# Patient Record
Sex: MALE | Race: WHITE | Employment: UNEMPLOYED | URBAN - METROPOLITAN AREA
[De-identification: names, ages, dates, MRNs, and addresses within clinical notes are randomized per-mention and may not be internally consistent; named-entity substitution may affect disease eponyms.]

---

## 2017-12-04 PROBLEM — E66.9 OBESITY: Status: ACTIVE | Noted: 2017-12-04

## 2017-12-04 PROBLEM — I50.9 NEW ONSET OF CONGESTIVE HEART FAILURE (HCC): Status: ACTIVE | Noted: 2017-12-04

## 2017-12-04 PROBLEM — I25.10 CAD (CORONARY ARTERY DISEASE): Status: ACTIVE | Noted: 2017-12-04

## 2017-12-04 PROBLEM — I10 HTN (HYPERTENSION): Status: ACTIVE | Noted: 2017-12-04

## 2017-12-04 PROBLEM — R60.0 BILATERAL LEG EDEMA: Status: ACTIVE | Noted: 2017-12-04

## 2017-12-11 ENCOUNTER — TELEPHONE (OUTPATIENT)
Dept: CARDIAC REHAB | Age: 62
End: 2017-12-11

## 2017-12-11 NOTE — TELEPHONE ENCOUNTER
office visit on 12/22 at 8:30 am with Jordon Ortiz. Sister will help get to these appointments. EDUCATION: Educated sister on sodium restriction daily and kept information basic as to not adding additional salt with cooking or at the table to his foods and fluid restriction of < 64 ounces daily--sister does mention he is \"trying to drink lots of water\"- again emphasized need to drink consistently about 48-64 ounces daily, the need for daily morning  Weights and to obtain a scale as soon as possible, follow-up, and medication compliance. Reviewed recommended level of activity. Advised to keep activity light and to avoid heavy exertion or holding his breath or being outside in extreme cold weather for long periods of time. Notified sister to call the doctor post discharge if the pt experiences shortness of breath, chest pain, swelling, cough, or weight gain or loss of three pounds in a day/five pounds in a week. Also notified sister to call the doctor if the pt feels dizzy, increased fatigue, decreased or difficulty urinating. Educated on daily morning weights and to call early with a 2-3 lb gain overnight or 5 lb gain in a week when she gets the scale for home use. Advised on discharge weight of 239 lb from the hospital. Advised on phone number of [facility] to sister. Sister verbalized understanding; stated she will call the doctor with any questions or signs of symptom worsening. No additional questions at this time. HF resource number made available for non-urgent questions. RECOMMENDATIONS:  ~[facility] to start with HF education, monitoring and medication review as soon as possible  ~Go to follow up appointments  ~F/U PCP office tomorrow. Care transition faxed to Dr Rozina Aceves. Care transition included reason for hospitalization, procedures performed during hospitalization, services provided during hospitalization, discharge medications, and follow-up treatment and services needed.   ~Cardiology f/u frequently for medication therapy adjustment    ~~~~~~~~~~~~~~~~~~~~~~~~~~~~~~~~~~~~~~~~~~~~~  Additional call placed and spoke to Watson from [facility]. Updated address where pt is staying and phone number given. Informed them this pt needs home care visit as soon as possible due to his new HF diagnosis and high likelihood of readmission with his low health literacy and limited resources.

## 2017-12-22 ENCOUNTER — OFFICE VISIT (OUTPATIENT)
Dept: CARDIOLOGY CLINIC | Age: 62
End: 2017-12-22

## 2017-12-22 VITALS
WEIGHT: 242 LBS | HEIGHT: 69 IN | BODY MASS INDEX: 35.84 KG/M2 | SYSTOLIC BLOOD PRESSURE: 130 MMHG | OXYGEN SATURATION: 98 % | HEART RATE: 71 BPM | DIASTOLIC BLOOD PRESSURE: 90 MMHG

## 2017-12-22 DIAGNOSIS — R06.02 SHORTNESS OF BREATH: ICD-10-CM

## 2017-12-22 DIAGNOSIS — R60.0 BILATERAL LEG EDEMA: ICD-10-CM

## 2017-12-22 DIAGNOSIS — I25.5 ISCHEMIC CARDIOMYOPATHY: Primary | ICD-10-CM

## 2017-12-22 DIAGNOSIS — I25.10 CORONARY ARTERY DISEASE INVOLVING NATIVE HEART WITHOUT ANGINA PECTORIS, UNSPECIFIED VESSEL OR LESION TYPE: ICD-10-CM

## 2017-12-22 DIAGNOSIS — I10 ESSENTIAL HYPERTENSION: ICD-10-CM

## 2017-12-22 PROCEDURE — G8417 CALC BMI ABV UP PARAM F/U: HCPCS | Performed by: NURSE PRACTITIONER

## 2017-12-22 PROCEDURE — 99214 OFFICE O/P EST MOD 30 MIN: CPT | Performed by: NURSE PRACTITIONER

## 2017-12-22 PROCEDURE — 1036F TOBACCO NON-USER: CPT | Performed by: NURSE PRACTITIONER

## 2017-12-22 PROCEDURE — G8598 ASA/ANTIPLAT THER USED: HCPCS | Performed by: NURSE PRACTITIONER

## 2017-12-22 PROCEDURE — 3017F COLORECTAL CA SCREEN DOC REV: CPT | Performed by: NURSE PRACTITIONER

## 2017-12-22 PROCEDURE — 1111F DSCHRG MED/CURRENT MED MERGE: CPT | Performed by: NURSE PRACTITIONER

## 2017-12-22 PROCEDURE — G8484 FLU IMMUNIZE NO ADMIN: HCPCS | Performed by: NURSE PRACTITIONER

## 2017-12-22 PROCEDURE — G8427 DOCREV CUR MEDS BY ELIG CLIN: HCPCS | Performed by: NURSE PRACTITIONER

## 2017-12-22 RX ORDER — CARVEDILOL 12.5 MG/1
TABLET ORAL
Qty: 60 TABLET | Refills: 3
Start: 2017-12-22 | End: 2018-01-02 | Stop reason: SDUPTHER

## 2017-12-22 NOTE — Clinical Note
Please call [facility] and get last BMP from last Friday that was drawn. Results are not in EPIC.  Thanks

## 2017-12-22 NOTE — PROGRESS NOTES
simvastatin (ZOCOR) 80 MG tablet Take 1 tablet by mouth nightly 12/8/17  Yes Kamran Leo MD   aspirin 325 MG EC tablet Take 1 tablet by mouth daily 12/9/17  Yes Kamran Leo MD   valsartan (DIOVAN) 320 MG tablet Take 1 tablet by mouth daily 12/9/17  Yes Kamran Leo MD   carvedilol (COREG) 12.5 MG tablet Take 1 tablet by mouth 2 times daily (with meals) 12/8/17  Yes Kamran Leo MD   amLODIPine (NORVASC) 10 MG tablet Take 1 tablet by mouth daily 12/9/17  Yes Kamran Leo MD   spironolactone (ALDACTONE) 25 MG tablet Take 1 tablet by mouth daily 12/9/17  Yes Kamran Leo MD   furosemide (LASIX) 40 MG tablet Take 1 tablet by mouth 2 times daily 12/8/17  Yes Kamran Leo MD   lorazepam (ATIVAN) 1 MG tablet Take 1 mg by mouth every 6 hours as needed. Yes Historical Provider, MD        Allergies:  Review of patient's allergies indicates no known allergies. Review of Systems:   · Constitutional: there has been no unanticipated weight loss. · Eyes: No vision changes  · ENT: No Headaches, no nasal congestion. No mouth sores or sore throat. · Cardiovascular: Reviewed in HPI  · Respiratory: + cough, no wheezing, no sputum production. · Gastrointestinal: No abdominal pain, no constipation or diarrhea  · Genitourinary: No dysuria, trouble voiding, or hematuria. · Musculoskeletal:  No weakness or joint complaints. · Integumentary: No rash or pruritis. · Neurological: No numbness or tingling. No weakness. No tremor. · Psychiatric: No anxiety, no depression. · Endocrine:  No excessive thirst or urination. · Hematologic/Lymphatic: No abnormal bruising or bleeding, blood clots or swollen lymph nodes.     Physical Examination:    Vitals:    12/22/17 0829 12/22/17 0833   BP: (!) 136/92 (!) 130/90   Pulse: 71    SpO2: 98%    Weight: 242 lb (109.8 kg)    Height: 5' 9\" (1.753 m)         Constitutional and General Appearance: no apparent distress  HEENT:

## 2018-01-02 ENCOUNTER — TELEPHONE (OUTPATIENT)
Dept: CARDIOLOGY CLINIC | Age: 63
End: 2018-01-02

## 2018-01-02 DIAGNOSIS — Z79.899 MEDICATION MANAGEMENT: Primary | ICD-10-CM

## 2018-01-02 RX ORDER — AMLODIPINE BESYLATE 10 MG/1
10 TABLET ORAL DAILY
Qty: 30 TABLET | Refills: 0 | Status: SHIPPED | OUTPATIENT
Start: 2018-01-02 | End: 2018-01-31 | Stop reason: SDUPTHER

## 2018-01-02 RX ORDER — VALSARTAN 320 MG/1
320 TABLET ORAL DAILY
Qty: 30 TABLET | Refills: 0 | Status: SHIPPED | OUTPATIENT
Start: 2018-01-02 | End: 2018-01-02 | Stop reason: SDUPTHER

## 2018-01-02 RX ORDER — AMLODIPINE BESYLATE 10 MG/1
10 TABLET ORAL DAILY
Qty: 30 TABLET | Refills: 0 | Status: SHIPPED | OUTPATIENT
Start: 2018-01-02 | End: 2018-01-02 | Stop reason: SDUPTHER

## 2018-01-02 RX ORDER — SPIRONOLACTONE 25 MG/1
25 TABLET ORAL DAILY
Qty: 30 TABLET | Refills: 0 | Status: SHIPPED | OUTPATIENT
Start: 2018-01-02 | End: 2018-01-02 | Stop reason: SDUPTHER

## 2018-01-02 RX ORDER — SPIRONOLACTONE 25 MG/1
25 TABLET ORAL DAILY
Qty: 30 TABLET | Refills: 0 | Status: SHIPPED | OUTPATIENT
Start: 2018-01-02 | End: 2018-01-31 | Stop reason: SDUPTHER

## 2018-01-02 RX ORDER — SIMVASTATIN 80 MG
80 TABLET ORAL NIGHTLY
Qty: 30 TABLET | Refills: 0 | Status: SHIPPED | OUTPATIENT
Start: 2018-01-02 | End: 2018-01-31 | Stop reason: SDUPTHER

## 2018-01-02 RX ORDER — SIMVASTATIN 80 MG
80 TABLET ORAL NIGHTLY
Qty: 30 TABLET | Refills: 0 | Status: SHIPPED | OUTPATIENT
Start: 2018-01-02 | End: 2018-01-02 | Stop reason: SDUPTHER

## 2018-01-02 RX ORDER — CARVEDILOL 12.5 MG/1
TABLET ORAL
Qty: 90 TABLET | Refills: 0 | Status: SHIPPED | OUTPATIENT
Start: 2018-01-02 | End: 2018-01-31 | Stop reason: SDUPTHER

## 2018-01-02 RX ORDER — FUROSEMIDE 40 MG/1
40 TABLET ORAL 2 TIMES DAILY
Qty: 60 TABLET | Refills: 0 | Status: SHIPPED | OUTPATIENT
Start: 2018-01-02 | End: 2018-01-02 | Stop reason: SDUPTHER

## 2018-01-02 RX ORDER — FUROSEMIDE 40 MG/1
40 TABLET ORAL 2 TIMES DAILY
Qty: 60 TABLET | Refills: 0 | Status: SHIPPED | OUTPATIENT
Start: 2018-01-02 | End: 2018-01-31 | Stop reason: SDUPTHER

## 2018-01-02 RX ORDER — VALSARTAN 320 MG/1
320 TABLET ORAL DAILY
Qty: 30 TABLET | Refills: 0 | Status: SHIPPED | OUTPATIENT
Start: 2018-01-02 | End: 2018-01-31 | Stop reason: SDUPTHER

## 2018-01-02 RX ORDER — CARVEDILOL 12.5 MG/1
TABLET ORAL
Qty: 90 TABLET | Refills: 0 | Status: SHIPPED | OUTPATIENT
Start: 2018-01-02 | End: 2018-01-02 | Stop reason: SDUPTHER

## 2018-01-02 NOTE — TELEPHONE ENCOUNTER
Scripts sent to [facility]. Family called scripts should be sent to [facility]. Scripts sent.

## 2018-01-17 ENCOUNTER — TELEPHONE (OUTPATIENT)
Dept: CARDIOLOGY CLINIC | Age: 63
End: 2018-01-17

## 2018-01-17 NOTE — TELEPHONE ENCOUNTER
SRINIVAS called wanting to know if smm wants the patient to have labs done before his appointment next week.  [facility] states orders can be faxed to [phone]

## 2018-01-23 ENCOUNTER — HOSPITAL ENCOUNTER (OUTPATIENT)
Dept: OTHER | Age: 63
Discharge: HOME | End: 2018-01-31
Attending: INTERNAL MEDICINE | Admitting: INTERNAL MEDICINE

## 2018-01-23 LAB
ANION GAP SERPL CALCULATED.3IONS-SCNC: 15 MMOL/L (ref 3–16)
BUN BLDV-MCNC: 22 MG/DL (ref 7–20)
CALCIUM SERPL-MCNC: 9.1 MG/DL (ref 8.3–10.6)
CHLORIDE BLD-SCNC: 94 MMOL/L (ref 99–110)
CO2: 27 MMOL/L (ref 21–32)
CREAT SERPL-MCNC: 0.8 MG/DL (ref 0.8–1.3)
GFR AFRICAN AMERICAN: >60
GFR NON-AFRICAN AMERICAN: >60
GLUCOSE BLD-MCNC: 278 MG/DL (ref 70–99)
POTASSIUM SERPL-SCNC: 4.4 MMOL/L (ref 3.5–5.1)
SODIUM BLD-SCNC: 136 MMOL/L (ref 136–145)

## 2018-01-24 ENCOUNTER — TELEPHONE (OUTPATIENT)
Dept: CARDIOLOGY CLINIC | Age: 63
End: 2018-01-24

## 2018-01-24 NOTE — TELEPHONE ENCOUNTER
Created telephone encounter. Per Pt HIPAA from can leave results on machine. LMOM relaying message per [facility] regarding labs. Pt to call the office with any concerns.

## 2018-01-25 ENCOUNTER — OFFICE VISIT (OUTPATIENT)
Dept: CARDIOLOGY CLINIC | Age: 63
End: 2018-01-25

## 2018-01-25 VITALS
HEIGHT: 69 IN | WEIGHT: 258.4 LBS | DIASTOLIC BLOOD PRESSURE: 80 MMHG | SYSTOLIC BLOOD PRESSURE: 134 MMHG | OXYGEN SATURATION: 97 % | BODY MASS INDEX: 38.27 KG/M2 | HEART RATE: 64 BPM

## 2018-01-25 DIAGNOSIS — I50.23 ACUTE ON CHRONIC SYSTOLIC CHF (CONGESTIVE HEART FAILURE) (HCC): ICD-10-CM

## 2018-01-25 DIAGNOSIS — I25.5 ISCHEMIC CARDIOMYOPATHY: ICD-10-CM

## 2018-01-25 DIAGNOSIS — I25.10 CORONARY ARTERY DISEASE INVOLVING NATIVE HEART WITHOUT ANGINA PECTORIS, UNSPECIFIED VESSEL OR LESION TYPE: Primary | ICD-10-CM

## 2018-01-25 DIAGNOSIS — I10 ESSENTIAL HYPERTENSION: ICD-10-CM

## 2018-01-25 PROCEDURE — 3017F COLORECTAL CA SCREEN DOC REV: CPT | Performed by: INTERNAL MEDICINE

## 2018-01-25 PROCEDURE — 99214 OFFICE O/P EST MOD 30 MIN: CPT | Performed by: INTERNAL MEDICINE

## 2018-01-25 PROCEDURE — G8484 FLU IMMUNIZE NO ADMIN: HCPCS | Performed by: INTERNAL MEDICINE

## 2018-01-25 PROCEDURE — G8598 ASA/ANTIPLAT THER USED: HCPCS | Performed by: INTERNAL MEDICINE

## 2018-01-25 PROCEDURE — G8417 CALC BMI ABV UP PARAM F/U: HCPCS | Performed by: INTERNAL MEDICINE

## 2018-01-25 PROCEDURE — G8427 DOCREV CUR MEDS BY ELIG CLIN: HCPCS | Performed by: INTERNAL MEDICINE

## 2018-01-25 PROCEDURE — 1036F TOBACCO NON-USER: CPT | Performed by: INTERNAL MEDICINE

## 2018-01-25 NOTE — PROGRESS NOTES
[address]   Cardiac Followup    Referring Provider:  Mason Andrea DO     Chief Complaint   Patient presents with    Follow-up     no cardiac complaints      Subjective:  Mr Randy Arndt is being seen today for cardiology follow up of CAD, CMP, CHF, HTN; no complaints today    History of Present Illness:  De Roly a 58 y. o. patient here for routine cardiac f/u. I originally met him in hospital 12/4/17. He has PMH of CAD s/p CABG 2009, remote MI, HLD, and HTN.  Formerly saw Dr. Miguel Pastor but no cardiac care for several years. Shahriar Lorena if taking meds regularly and I question his compliance and medical understanding. Hira Crawford reports leg swelling for 5-6 months.  His right leg became red and swelling worse prompting him to seek medical attention. Prior IRVIN in May 2007 showed normal LV function; no wall motion abnl; no vegetation or thrombus. His admit EKG showed NSR, LAE, LVH, nonspecific IVCD, PVC; ST abnormality consider anterolateral ischemia (T wave change from 8/16 EKG). Note most recent ECHO 12/4/17 showed severe LV systolic dysfunction NO=43-66% with multiple WMA's; moderately dilated LV; grade III diastolic dysfunction with elevated filling pressure; mild MR/AI; RV enlargement with moderately reduced systolic function. Note pro-BNP elevated >10,000 and no Wu enzymes available with CXR negative.              [id number] withacute on chronic systolic CHF likely due to ischemic CM in patient without cardiac care and questionable medication compliance for years. Most recent Nik Pila 12/6/17 no evidence of stress induced ischemia. Large sized anterior,  anteroapical, apical, septal, and inferior fixed defects consistent with  infarction in the territory of the proximal to distal LAD and/or RCA. Only  the lateral wall appears to have decent perfusion. There is severe global LV  systolic dysfunction with ejection fraction of 24%. LV cavity appears mildly  dilated.  Findings are c/w severe ischemic cardiomyopathy. Today he reports to feeling good and he has no complaints of chest pain, SOB, palpitations, dizziness, edema, or orthopnea/PND. He reports to walking up and down driveway 1/4 mile each way without difficulty. His sister is present at exam. He reports that NP Kristian Ahumada decreased lasix from 80 to 40mg daily. Past Medical History:   has a past medical history of Acute MI; CAD (coronary artery disease); CHF (congestive heart failure) ([address] and Hypertension. Surgical History:   has a past surgical history that includes Cardiac surgery. Social History:   reports that he has never smoked. He has never used smokeless tobacco. He reports that he drinks alcohol. Family History:  family history is not on file. Home Medications:  Prior to Admission medications    Medication Sig Start Date End Date Taking? Authorizing Provider   carvedilol (COREG) 12.5 MG tablet 1 tab in the morning and 2 tabs in the evening with food 1/2/18  Yes Marlo Reyna MD   valsartan (DIOVAN) 320 MG tablet Take 1 tablet by mouth daily 1/2/18  Yes Marlo Reyna MD   amLODIPine (NORVASC) 10 MG tablet Take 1 tablet by mouth daily 1/2/18  Yes Marlo Reyna MD   spironolactone (ALDACTONE) 25 MG tablet Take 1 tablet by mouth daily 1/2/18  Yes Marlo Reyna MD   simvastatin (ZOCOR) 80 MG tablet Take 1 tablet by mouth nightly 1/2/18  Yes Marlo Reyna MD   furosemide (LASIX) 40 MG tablet Take 1 tablet by mouth 2 times daily  Patient taking differently: Take 40 mg by mouth daily  1/2/18  Yes Marlo Reyna MD   aspirin 325 MG EC tablet Take 1 tablet by mouth daily 12/9/17  Yes Eve Rae MD   lorazepam (ATIVAN) 1 MG tablet Take 1 mg by mouth every 6 hours as needed. Yes Historical Provider, MD        Allergies:  Review of patient's allergies indicates no known allergies. Review of Systems:   · Constitutional: there has been no unanticipated weight loss.  There's been no change in

## 2018-01-25 NOTE — PATIENT INSTRUCTIONS
Plan:  1. Will check limited ECHO mid February  2. Cut Aspirin 325 mg down to 1/2 tab then switch to 81 mg after bottle completed  3. Increase Lasix to 80 mg per day Keep track of daily weight. If edema or weight increases 3-4 lbs  4. Check CMP, lipids in 7-10 days  5.  Follow up in 3 months

## 2018-01-31 RX ORDER — VALSARTAN 320 MG/1
320 TABLET ORAL DAILY
Qty: 90 TABLET | Refills: 3 | Status: SHIPPED | OUTPATIENT
Start: 2018-01-31 | End: 2018-05-29 | Stop reason: SDUPTHER

## 2018-01-31 RX ORDER — SPIRONOLACTONE 25 MG/1
25 TABLET ORAL DAILY
Qty: 90 TABLET | Refills: 3 | Status: SHIPPED | OUTPATIENT
Start: 2018-01-31 | End: 2018-10-19 | Stop reason: SDUPTHER

## 2018-01-31 RX ORDER — AMLODIPINE BESYLATE 10 MG/1
10 TABLET ORAL DAILY
Qty: 90 TABLET | Refills: 3 | Status: SHIPPED | OUTPATIENT
Start: 2018-01-31 | End: 2018-05-29 | Stop reason: SDUPTHER

## 2018-01-31 RX ORDER — FUROSEMIDE 40 MG/1
80 TABLET ORAL DAILY
Qty: 90 TABLET | Refills: 3 | Status: SHIPPED | OUTPATIENT
Start: 2018-01-31 | End: 2018-06-15 | Stop reason: SDUPTHER

## 2018-01-31 RX ORDER — CARVEDILOL 12.5 MG/1
TABLET ORAL
Qty: 270 TABLET | Refills: 3 | Status: SHIPPED | OUTPATIENT
Start: 2018-01-31 | End: 2019-02-19 | Stop reason: SDUPTHER

## 2018-01-31 RX ORDER — SIMVASTATIN 80 MG
80 TABLET ORAL NIGHTLY
Qty: 90 TABLET | Refills: 3 | Status: ON HOLD | OUTPATIENT
Start: 2018-01-31 | End: 2018-04-15

## 2018-02-01 ENCOUNTER — HOSPITAL ENCOUNTER (OUTPATIENT)
Dept: OTHER | Age: 63
Discharge: HOME | End: 2018-02-28
Attending: INTERNAL MEDICINE | Admitting: INTERNAL MEDICINE

## 2018-02-14 ENCOUNTER — HOSPITAL ENCOUNTER (OUTPATIENT)
Dept: OTHER | Age: 63
Discharge: HOME | End: 2018-02-14
Attending: INTERNAL MEDICINE | Admitting: INTERNAL MEDICINE

## 2018-02-14 LAB
A/G RATIO: 1.2 (ref 1.1–2.2)
ALBUMIN SERPL-MCNC: 4.4 G/DL (ref 3.4–5)
ALP BLD-CCNC: 66 U/L (ref 40–129)
ALT SERPL-CCNC: 19 U/L (ref 10–40)
ANION GAP SERPL CALCULATED.3IONS-SCNC: 13 MMOL/L (ref 3–16)
AST SERPL-CCNC: 18 U/L (ref 15–37)
BILIRUB SERPL-MCNC: 0.5 MG/DL (ref 0–1)
BUN BLDV-MCNC: 27 MG/DL (ref 7–20)
CALCIUM SERPL-MCNC: 9.4 MG/DL (ref 8.3–10.6)
CHLORIDE BLD-SCNC: 97 MMOL/L (ref 99–110)
CHOLESTEROL, FASTING: 207 MG/DL (ref 0–199)
CO2: 30 MMOL/L (ref 21–32)
CREAT SERPL-MCNC: 0.9 MG/DL (ref 0.8–1.3)
GFR AFRICAN AMERICAN: >60
GFR NON-AFRICAN AMERICAN: >60
GLOBULIN: 3.7 G/DL
GLUCOSE FASTING: 191 MG/DL (ref 70–99)
HDLC SERPL-MCNC: 42 MG/DL (ref 40–60)
LDL CHOLESTEROL CALCULATED: 112 MG/DL
POTASSIUM SERPL-SCNC: 4.1 MMOL/L (ref 3.5–5.1)
SODIUM BLD-SCNC: 140 MMOL/L (ref 136–145)
TOTAL PROTEIN: 8.1 G/DL (ref 6.4–8.2)
TRIGLYCERIDE, FASTING: 267 MG/DL (ref 0–150)
VLDLC SERPL CALC-MCNC: 53 MG/DL

## 2018-02-15 DIAGNOSIS — I25.10 CORONARY ARTERY DISEASE INVOLVING NATIVE CORONARY ARTERY OF NATIVE HEART WITHOUT ANGINA PECTORIS: Primary | ICD-10-CM

## 2018-02-15 RX ORDER — ROSUVASTATIN CALCIUM 20 MG/1
20 TABLET, COATED ORAL DAILY
Qty: 30 TABLET | Refills: 3 | Status: SHIPPED | OUTPATIENT
Start: 2018-02-15 | End: 2018-05-15 | Stop reason: SDUPTHER

## 2018-02-15 NOTE — TELEPHONE ENCOUNTER
----- Message from Uriel Simon MD sent at 2/14/2018  5:23 PM EST -----  Labs are good except for LDL > 100, TG > 200 (267), and TC > 200 (207). Was he fasting? Is he taking 80mg zocor qhs? If yest to these questions I would switch him to generic crestor 20mg daily. He needs to watch diet closely and would repeat fasting lipids and LFT's in 2 months.

## 2018-02-16 ENCOUNTER — HOSPITAL ENCOUNTER (OUTPATIENT)
Dept: NON INVASIVE DIAGNOSTICS | Age: 63
Discharge: HOME | End: 2018-02-16
Attending: INTERNAL MEDICINE | Admitting: INTERNAL MEDICINE

## 2018-02-16 DIAGNOSIS — I50.23 ACUTE ON CHRONIC SYSTOLIC CONGESTIVE HEART FAILURE (HCC): ICD-10-CM

## 2018-02-19 ENCOUNTER — TELEPHONE (OUTPATIENT)
Dept: CARDIOLOGY CLINIC | Age: 63
End: 2018-02-19

## 2018-04-02 ENCOUNTER — OFFICE VISIT (OUTPATIENT)
Dept: CARDIOLOGY CLINIC | Age: 63
End: 2018-04-02

## 2018-04-02 VITALS
DIASTOLIC BLOOD PRESSURE: 76 MMHG | HEIGHT: 69 IN | SYSTOLIC BLOOD PRESSURE: 110 MMHG | BODY MASS INDEX: 36.88 KG/M2 | HEART RATE: 82 BPM | WEIGHT: 249 LBS

## 2018-04-02 DIAGNOSIS — I25.5 ISCHEMIC CARDIOMYOPATHY: Primary | ICD-10-CM

## 2018-04-02 PROCEDURE — G8427 DOCREV CUR MEDS BY ELIG CLIN: HCPCS | Performed by: INTERNAL MEDICINE

## 2018-04-02 PROCEDURE — 93000 ELECTROCARDIOGRAM COMPLETE: CPT | Performed by: INTERNAL MEDICINE

## 2018-04-02 PROCEDURE — 3017F COLORECTAL CA SCREEN DOC REV: CPT | Performed by: INTERNAL MEDICINE

## 2018-04-02 PROCEDURE — G8417 CALC BMI ABV UP PARAM F/U: HCPCS | Performed by: INTERNAL MEDICINE

## 2018-04-02 PROCEDURE — 99215 OFFICE O/P EST HI 40 MIN: CPT | Performed by: INTERNAL MEDICINE

## 2018-04-03 ENCOUNTER — TELEPHONE (OUTPATIENT)
Dept: CARDIOLOGY CLINIC | Age: 63
End: 2018-04-03

## 2018-04-13 ENCOUNTER — HOSPITAL ENCOUNTER (OUTPATIENT)
Dept: OTHER | Age: 63
Discharge: HOME | End: 2018-04-13
Attending: INTERNAL MEDICINE | Admitting: INTERNAL MEDICINE

## 2018-04-13 LAB
CHOLESTEROL, FASTING: 263 MG/DL (ref 0–199)
HDLC SERPL-MCNC: 39 MG/DL (ref 40–60)
LDL CHOLESTEROL CALCULATED: ABNORMAL MG/DL
LDL CHOLESTEROL DIRECT: 151 MG/DL
TRIGLYCERIDE, FASTING: 567 MG/DL (ref 0–150)
VLDLC SERPL CALC-MCNC: ABNORMAL MG/DL

## 2018-04-15 PROBLEM — E11.10 DKA, TYPE 2, NOT AT GOAL (HCC): Status: ACTIVE | Noted: 2018-04-15

## 2018-04-16 ENCOUNTER — TELEPHONE (OUTPATIENT)
Dept: CARDIOLOGY CLINIC | Age: 63
End: 2018-04-16

## 2018-04-17 PROBLEM — E11.9 DIABETES MELLITUS (HCC): Status: ACTIVE | Noted: 2018-04-17

## 2018-04-17 PROBLEM — E11.10 DKA, TYPE 2, NOT AT GOAL (HCC): Status: RESOLVED | Noted: 2018-04-15 | Resolved: 2018-04-17

## 2018-04-18 DIAGNOSIS — Z95.810 BIVENTRICULAR ICD (IMPLANTABLE CARDIOVERTER-DEFIBRILLATOR) IN PLACE: Primary | ICD-10-CM

## 2018-04-19 ENCOUNTER — PROCEDURE VISIT (OUTPATIENT)
Dept: CARDIOLOGY CLINIC | Age: 63
End: 2018-04-19

## 2018-04-19 DIAGNOSIS — Z95.810 BIVENTRICULAR ICD (IMPLANTABLE CARDIOVERTER-DEFIBRILLATOR) IN PLACE: Primary | ICD-10-CM

## 2018-04-19 PROBLEM — I44.7 LBBB (LEFT BUNDLE BRANCH BLOCK): Status: ACTIVE | Noted: 2018-04-19

## 2018-04-19 PROCEDURE — 93284 PRGRMG EVAL IMPLANTABLE DFB: CPT | Performed by: INTERNAL MEDICINE

## 2018-04-27 ENCOUNTER — TELEPHONE (OUTPATIENT)
Dept: CARDIOLOGY CLINIC | Age: 63
End: 2018-04-27

## 2018-04-27 ENCOUNTER — PROCEDURE VISIT (OUTPATIENT)
Dept: CARDIOLOGY CLINIC | Age: 63
End: 2018-04-27

## 2018-04-27 DIAGNOSIS — I25.5 ISCHEMIC CARDIOMYOPATHY: ICD-10-CM

## 2018-04-27 DIAGNOSIS — I42.9 CARDIOMYOPATHY, UNSPECIFIED TYPE (HCC): ICD-10-CM

## 2018-04-27 DIAGNOSIS — I44.7 LBBB (LEFT BUNDLE BRANCH BLOCK): ICD-10-CM

## 2018-04-27 DIAGNOSIS — Z95.810 BIVENTRICULAR ICD (IMPLANTABLE CARDIOVERTER-DEFIBRILLATOR) IN PLACE: Primary | ICD-10-CM

## 2018-05-14 ENCOUNTER — OFFICE VISIT (OUTPATIENT)
Dept: CARDIOLOGY CLINIC | Age: 63
End: 2018-05-14

## 2018-05-14 ENCOUNTER — PROCEDURE VISIT (OUTPATIENT)
Dept: CARDIOLOGY CLINIC | Age: 63
End: 2018-05-14

## 2018-05-14 VITALS
HEART RATE: 67 BPM | BODY MASS INDEX: 37.92 KG/M2 | SYSTOLIC BLOOD PRESSURE: 110 MMHG | DIASTOLIC BLOOD PRESSURE: 70 MMHG | WEIGHT: 256 LBS | OXYGEN SATURATION: 98 % | HEIGHT: 69 IN

## 2018-05-14 DIAGNOSIS — I25.10 CORONARY ARTERY DISEASE INVOLVING NATIVE CORONARY ARTERY OF NATIVE HEART WITHOUT ANGINA PECTORIS: ICD-10-CM

## 2018-05-14 DIAGNOSIS — I10 ESSENTIAL HYPERTENSION: ICD-10-CM

## 2018-05-14 DIAGNOSIS — I25.5 ISCHEMIC CARDIOMYOPATHY: Primary | ICD-10-CM

## 2018-05-14 DIAGNOSIS — Z95.810 BIVENTRICULAR ICD (IMPLANTABLE CARDIOVERTER-DEFIBRILLATOR) IN PLACE: Primary | ICD-10-CM

## 2018-05-14 DIAGNOSIS — I25.5 ISCHEMIC CARDIOMYOPATHY: ICD-10-CM

## 2018-05-14 DIAGNOSIS — I44.7 LBBB (LEFT BUNDLE BRANCH BLOCK): ICD-10-CM

## 2018-05-14 DIAGNOSIS — I50.9 NEW ONSET OF CONGESTIVE HEART FAILURE (HCC): ICD-10-CM

## 2018-05-14 DIAGNOSIS — E87.1 HYPONATREMIA: ICD-10-CM

## 2018-05-14 PROCEDURE — G8427 DOCREV CUR MEDS BY ELIG CLIN: HCPCS | Performed by: NURSE PRACTITIONER

## 2018-05-14 PROCEDURE — 3017F COLORECTAL CA SCREEN DOC REV: CPT | Performed by: NURSE PRACTITIONER

## 2018-05-14 PROCEDURE — G8417 CALC BMI ABV UP PARAM F/U: HCPCS | Performed by: NURSE PRACTITIONER

## 2018-05-14 PROCEDURE — 1036F TOBACCO NON-USER: CPT | Performed by: NURSE PRACTITIONER

## 2018-05-14 PROCEDURE — 93284 PRGRMG EVAL IMPLANTABLE DFB: CPT | Performed by: INTERNAL MEDICINE

## 2018-05-14 PROCEDURE — 99214 OFFICE O/P EST MOD 30 MIN: CPT | Performed by: NURSE PRACTITIONER

## 2018-05-14 PROCEDURE — G8598 ASA/ANTIPLAT THER USED: HCPCS | Performed by: NURSE PRACTITIONER

## 2018-05-14 PROCEDURE — 1111F DSCHRG MED/CURRENT MED MERGE: CPT | Performed by: NURSE PRACTITIONER

## 2018-05-16 RX ORDER — ROSUVASTATIN CALCIUM 20 MG/1
20 TABLET, COATED ORAL DAILY
Qty: 90 TABLET | Refills: 1 | Status: SHIPPED | OUTPATIENT
Start: 2018-05-16 | End: 2018-06-15 | Stop reason: SDUPTHER

## 2018-05-21 ENCOUNTER — HOSPITAL ENCOUNTER (OUTPATIENT)
Dept: OTHER | Age: 63
Discharge: HOME | End: 2018-05-21
Attending: NURSE PRACTITIONER | Admitting: NURSE PRACTITIONER

## 2018-05-21 DIAGNOSIS — E87.1 HYPONATREMIA: ICD-10-CM

## 2018-05-21 LAB
ANION GAP SERPL CALCULATED.3IONS-SCNC: 17 MMOL/L (ref 3–16)
BUN BLDV-MCNC: 36 MG/DL (ref 7–20)
CALCIUM SERPL-MCNC: 9.2 MG/DL (ref 8.3–10.6)
CHLORIDE BLD-SCNC: 103 MMOL/L (ref 99–110)
CO2: 25 MMOL/L (ref 21–32)
CREAT SERPL-MCNC: 1.2 MG/DL (ref 0.8–1.3)
GFR AFRICAN AMERICAN: >60
GFR NON-AFRICAN AMERICAN: >60
GLUCOSE BLD-MCNC: 55 MG/DL (ref 70–99)
POTASSIUM SERPL-SCNC: 4.1 MMOL/L (ref 3.5–5.1)
SODIUM BLD-SCNC: 145 MMOL/L (ref 136–145)

## 2018-05-22 ENCOUNTER — TELEPHONE (OUTPATIENT)
Dept: CARDIOLOGY CLINIC | Age: 63
End: 2018-05-22

## 2018-05-30 RX ORDER — VALSARTAN 320 MG/1
320 TABLET ORAL DAILY
Qty: 90 TABLET | Refills: 3 | Status: SHIPPED | OUTPATIENT
Start: 2018-05-30 | End: 2018-06-13 | Stop reason: SDUPTHER

## 2018-05-30 RX ORDER — AMLODIPINE BESYLATE 10 MG/1
10 TABLET ORAL DAILY
Qty: 90 TABLET | Refills: 3 | Status: SHIPPED | OUTPATIENT
Start: 2018-05-30 | End: 2018-06-13 | Stop reason: SDUPTHER

## 2018-06-13 RX ORDER — AMLODIPINE BESYLATE 10 MG/1
10 TABLET ORAL DAILY
Qty: 90 TABLET | Refills: 3 | Status: SHIPPED | OUTPATIENT
Start: 2018-06-13 | End: 2019-07-01

## 2018-06-13 RX ORDER — VALSARTAN 320 MG/1
320 TABLET ORAL DAILY
Qty: 90 TABLET | Refills: 3 | Status: SHIPPED | OUTPATIENT
Start: 2018-06-13 | End: 2018-10-19 | Stop reason: ALTCHOICE

## 2018-06-15 RX ORDER — ROSUVASTATIN CALCIUM 20 MG/1
20 TABLET, COATED ORAL DAILY
Qty: 90 TABLET | Refills: 5 | Status: SHIPPED | OUTPATIENT
Start: 2018-06-15 | End: 2018-12-04 | Stop reason: SDUPTHER

## 2018-06-15 RX ORDER — FUROSEMIDE 40 MG/1
80 TABLET ORAL DAILY
Qty: 90 TABLET | Refills: 5 | Status: SHIPPED | OUTPATIENT
Start: 2018-06-15 | End: 2019-04-04 | Stop reason: SDUPTHER

## 2018-07-16 ENCOUNTER — PROCEDURE VISIT (OUTPATIENT)
Dept: CARDIOLOGY CLINIC | Age: 63
End: 2018-07-16

## 2018-07-16 ENCOUNTER — OFFICE VISIT (OUTPATIENT)
Dept: CARDIOLOGY CLINIC | Age: 63
End: 2018-07-16

## 2018-07-16 VITALS
HEART RATE: 70 BPM | BODY MASS INDEX: 38.21 KG/M2 | DIASTOLIC BLOOD PRESSURE: 70 MMHG | SYSTOLIC BLOOD PRESSURE: 90 MMHG | HEIGHT: 69 IN | WEIGHT: 258 LBS | OXYGEN SATURATION: 97 %

## 2018-07-16 DIAGNOSIS — I50.22 CHRONIC SYSTOLIC CONGESTIVE HEART FAILURE (HCC): ICD-10-CM

## 2018-07-16 DIAGNOSIS — I25.5 ISCHEMIC CARDIOMYOPATHY: Primary | ICD-10-CM

## 2018-07-16 DIAGNOSIS — I42.9 CARDIOMYOPATHY, UNSPECIFIED TYPE (HCC): ICD-10-CM

## 2018-07-16 DIAGNOSIS — Z95.810 BIVENTRICULAR ICD (IMPLANTABLE CARDIOVERTER-DEFIBRILLATOR) IN PLACE: Primary | ICD-10-CM

## 2018-07-16 DIAGNOSIS — E87.1 HYPONATREMIA: ICD-10-CM

## 2018-07-16 DIAGNOSIS — I25.10 CORONARY ARTERY DISEASE INVOLVING NATIVE CORONARY ARTERY OF NATIVE HEART WITHOUT ANGINA PECTORIS: ICD-10-CM

## 2018-07-16 DIAGNOSIS — I10 ESSENTIAL HYPERTENSION: ICD-10-CM

## 2018-07-16 DIAGNOSIS — I25.5 ISCHEMIC CARDIOMYOPATHY: ICD-10-CM

## 2018-07-16 PROCEDURE — 3017F COLORECTAL CA SCREEN DOC REV: CPT | Performed by: NURSE PRACTITIONER

## 2018-07-16 PROCEDURE — 93284 PRGRMG EVAL IMPLANTABLE DFB: CPT | Performed by: INTERNAL MEDICINE

## 2018-07-16 PROCEDURE — 93290 INTERROG DEV EVAL ICPMS IP: CPT | Performed by: NURSE PRACTITIONER

## 2018-07-16 PROCEDURE — G8598 ASA/ANTIPLAT THER USED: HCPCS | Performed by: NURSE PRACTITIONER

## 2018-07-16 PROCEDURE — G8417 CALC BMI ABV UP PARAM F/U: HCPCS | Performed by: NURSE PRACTITIONER

## 2018-07-16 PROCEDURE — 1036F TOBACCO NON-USER: CPT | Performed by: NURSE PRACTITIONER

## 2018-07-16 PROCEDURE — 99214 OFFICE O/P EST MOD 30 MIN: CPT | Performed by: NURSE PRACTITIONER

## 2018-07-16 PROCEDURE — G8427 DOCREV CUR MEDS BY ELIG CLIN: HCPCS | Performed by: NURSE PRACTITIONER

## 2018-07-16 NOTE — PROGRESS NOTES
spironolactone (ALDACTONE) 25 MG tablet Take 1 tablet by mouth daily 90 tablet 3     No current facility-administered medications for this visit. REVIEW OF SYSTEMS:    CONSTITUTIONAL: No major weight gain or loss, fatigue, weakness, night sweats or fever. HEENT: No new vision difficulties or ringing in the ears. RESPIRATORY: No new SOB, PND, orthopnea or cough. CARDIOVASCULAR: See HPI  GI: No nausea, vomiting, diarrhea, constipation, abdominal pain or changes in bowel habits. : No urinary frequency, urgency, incontinence hematuria or dysuria. SKIN: No cyanosis or skin lesions. MUSCULOSKELETAL: No new muscle or joint pain. NEUROLOGICAL: No syncope or TIA-like symptoms. PSYCHIATRIC: No anxiety, pain, insomnia or depression    Objective:   PHYSICAL EXAM:       Vitals:    07/16/18 1051   BP: 90/70   Pulse: 70   SpO2: 97%   Weight: 258 lb (117 kg)   Height: 5' 9\" (1.753 m)        VITALS:  BP 90/70   Pulse 70   Ht 5' 9\" (1.753 m)   Wt 258 lb (117 kg)   SpO2 97%   BMI 38.10 kg/m²   CONSTITUTIONAL: Cooperative, no apparent distress, and appears well nourished / developed  NEUROLOGIC:  Awake and orientated to person, place and time. PSYCH: Calm affect. SKIN: Warm and dry. HEENT: Sclera non-icteric, normocephalic, neck supple, no elevation of JVP, normal carotid pulses with no bruits and thyroid normal size. LUNGS:  No increased work of breathing and clear to auscultation, no crackles or wheezing  CARDIOVASCULAR:  Regular rate 60 and rhythm with no murmurs, gallops, rubs, or abnormal heart sounds, normal PMI. The apical impulses not displaced  JVP less than 8 cm H2O  Heart tones are crisp and normal  Cervical veins are not engorged  The carotid upstroke is normal in amplitude and contour without delay or bruit  JVP is not elevated  ABDOMEN:  Normal bowel sounds, non-distended and non-tender to palpation  EXT: No edema, no calf tenderness. Pulses are present bilaterally.     DATA:    Lab Results angina pectoris   ~s/p CABG  ~stable : denies angina  ~ASA / statin / carvedilol     3. Essential hypertension   ~controlled    4. Hyponatremia   ~resolved           I had the opportunity to review the clinical symptoms and presentation of Jayna Nick. Plan:     1. Continue present management  2. F/U in 4 months     Overall the patient is stable from CV standpoint    I have addresed the patient's cardiac risk factors and adjusted pharmacologic treatment as needed. In addition, I have reinforced the need for patient directed risk factor modification. Further evaluation will be based upon the patient's clinical course and testing results. All questions and concerns were addressed to the patient/sister. Alternatives to my treatment were discussed. The patient is not currently smoking. The risks related to smoking were reviewed with the patient. Recommend maintaining a smoke-free lifestyle. Products available for smoking cessation were discussed in detail. Patient is on a beta-blocker  Patient is on an ARB  Patient is on a statin     Antiplatelet therapy has been recommended / prescribed for this patient. Education conducted on adverse reactions including bleeding was discussed. Angiotension inhibitor/angiotension receptor blocker has been prescribed / recommended for congestive heart failure. Daily weight, low sodium diet were discussed. Patient instructed to call the office with a weight gain: > 3 # over night or 5# in one week; swelling, SOB/orthopnea/PND    The patient verbalizes understanding not to stop medications without discussing with us. Discussed exercise: 30-60 minutes 7 days/week  Discussed Low saturated fat/JAELYN diet. SMBG 87    Thank you for allowing to us to participate in the care of Jayna Nick.     NIXON Frost    Documentation of today's visit sent to PCP

## 2018-09-12 ENCOUNTER — TELEPHONE (OUTPATIENT)
Dept: CARDIOLOGY CLINIC | Age: 63
End: 2018-09-12

## 2018-09-12 RX ORDER — IRBESARTAN 300 MG/1
300 TABLET ORAL NIGHTLY
Qty: 90 TABLET | Refills: 3 | Status: SHIPPED | OUTPATIENT
Start: 2018-09-12 | End: 2020-01-27

## 2018-10-19 ENCOUNTER — PROCEDURE VISIT (OUTPATIENT)
Dept: CARDIOLOGY CLINIC | Age: 63
End: 2018-10-19
Payer: COMMERCIAL

## 2018-10-19 ENCOUNTER — OFFICE VISIT (OUTPATIENT)
Dept: CARDIOLOGY CLINIC | Age: 63
End: 2018-10-19
Payer: COMMERCIAL

## 2018-10-19 VITALS
WEIGHT: 290 LBS | HEART RATE: 74 BPM | SYSTOLIC BLOOD PRESSURE: 150 MMHG | OXYGEN SATURATION: 98 % | DIASTOLIC BLOOD PRESSURE: 100 MMHG | HEIGHT: 69 IN | BODY MASS INDEX: 42.95 KG/M2

## 2018-10-19 DIAGNOSIS — I42.9 CARDIOMYOPATHY, UNSPECIFIED TYPE (HCC): Primary | ICD-10-CM

## 2018-10-19 DIAGNOSIS — I50.9 NEW ONSET OF CONGESTIVE HEART FAILURE (HCC): ICD-10-CM

## 2018-10-19 DIAGNOSIS — Z95.810 BIVENTRICULAR ICD (IMPLANTABLE CARDIOVERTER-DEFIBRILLATOR) IN PLACE: ICD-10-CM

## 2018-10-19 DIAGNOSIS — I25.5 ISCHEMIC CARDIOMYOPATHY: ICD-10-CM

## 2018-10-19 DIAGNOSIS — Z95.810 BIVENTRICULAR ICD (IMPLANTABLE CARDIOVERTER-DEFIBRILLATOR) IN PLACE: Primary | ICD-10-CM

## 2018-10-19 PROCEDURE — 93284 PRGRMG EVAL IMPLANTABLE DFB: CPT | Performed by: INTERNAL MEDICINE

## 2018-10-19 PROCEDURE — G8427 DOCREV CUR MEDS BY ELIG CLIN: HCPCS | Performed by: INTERNAL MEDICINE

## 2018-10-19 PROCEDURE — 3017F COLORECTAL CA SCREEN DOC REV: CPT | Performed by: INTERNAL MEDICINE

## 2018-10-19 PROCEDURE — G8598 ASA/ANTIPLAT THER USED: HCPCS | Performed by: INTERNAL MEDICINE

## 2018-10-19 PROCEDURE — G8417 CALC BMI ABV UP PARAM F/U: HCPCS | Performed by: INTERNAL MEDICINE

## 2018-10-19 PROCEDURE — 99214 OFFICE O/P EST MOD 30 MIN: CPT | Performed by: INTERNAL MEDICINE

## 2018-10-19 PROCEDURE — G8484 FLU IMMUNIZE NO ADMIN: HCPCS | Performed by: INTERNAL MEDICINE

## 2018-10-19 PROCEDURE — 1036F TOBACCO NON-USER: CPT | Performed by: INTERNAL MEDICINE

## 2018-10-19 PROCEDURE — 93290 INTERROG DEV EVAL ICPMS IP: CPT | Performed by: INTERNAL MEDICINE

## 2018-10-19 NOTE — PROGRESS NOTES
[facility]   Cardiac Follow Up            HPI:  Quinton Torres is a 58 y.o. male who is here today for evaluation of ischemic CM and discussion of SCA risk. He has a PMH of CAD s/p CABG 2009, HTN and obesity, recently admitted from 12/4/17-12/8/17 for CHF. His echo on 12/4/17 showed his LVEF was  25-30%. Myoview stress test showed fixed anterior defects, no ischemia with LVEF .24. His echocardiogram from 2/6/18 showed his LVEF was still 25-30% despite more than 90 days of optimally adjusted GDMT with Coreg and valsartan. He underwent VVI ICD placement on 4/18/18. His device check today shows normal pacing and sensing functions. He is V paced 91% of the time. He states he has been feeling well. He has more energy and has less SOB since his device has been placed. His blood pressure is elevated today, but he admits to not taking his medications this morning. Patient currently denies any weight gain, edema, palpitations, chest pain, dizziness, and syncope. Past Medical History:   has a past medical history of Acute MI ([facility] [address] CAD (coronary artery disease); CHF (congestive heart failure) ([facility] [address] Diabetes ([facility] [address] and Hypertension. Surgical History:   has a past surgical history that includes Cardiac surgery. Social History:   reports that he has never smoked. He has never used smokeless tobacco. He reports that he drinks alcohol. Family History:  family history is not on file.      Home Medications:  Outpatient Encounter Prescriptions as of 10/19/2018   Medication Sig Dispense Refill    irbesartan (AVAPRO) 300 MG tablet Take 1 tablet by mouth nightly 90 tablet 3    insulin glargine (BASAGLAR KWIKPEN) 100 UNIT/ML injection pen Inject 10 Units into the skin nightly Hold BS </= 90      furosemide (LASIX) 40 MG tablet Take 2 tablets by mouth daily 90 tablet 5    rosuvastatin (CRESTOR) 20 MG tablet Take 1 tablet by mouth daily 90 tablet 5    amLODIPine (NORVASC) 10 MG tablet Take 1 persistent LV dysfunction with LVEF < .30 after remote MI despite appropriate therapy with Coreg and valsartan and underwent implantation of primary prevention ICD. 2.      Normal BiV ICD functions   3. HTN      QUALITY MEASURES  1. Tobacco Cessation Counseling: NA  2. Retake of BP if >140/90:   NA  3. Documentation to PCP/referring for new patient:  Sent to PCP at close of office visit  4. CAD patient on anti-platelet: Yes  5. CAD patient on STATIN therapy:  Yes  6. Patient with CHF and aFib on anticoagulation:  No       Plan:  1. Continue current medications  2. Restart Aldactone at 12.5 mg daily   3. Remote device checks every 3 months   4. Follow up with me in 6 months     Scribe's attestation: This note was scribed in the presence of Dr. Missie Fleischer, M.D. By Kaitlynn Cook Dr. Vinicio Sung, personally performed the services described in this documentation as scribed by Beverley Bryant RN  in my presence, and it is both accurate and complete.       Vinicio Sung M.D.

## 2018-10-20 RX ORDER — SPIRONOLACTONE 25 MG/1
12.5 TABLET ORAL DAILY
Qty: 90 TABLET | Refills: 3 | Status: SHIPPED | OUTPATIENT
Start: 2018-10-20 | End: 2019-07-01 | Stop reason: SDUPTHER

## 2018-11-26 ENCOUNTER — OFFICE VISIT (OUTPATIENT)
Dept: CARDIOLOGY CLINIC | Age: 63
End: 2018-11-26
Payer: COMMERCIAL

## 2018-11-26 VITALS
HEART RATE: 74 BPM | OXYGEN SATURATION: 98 % | SYSTOLIC BLOOD PRESSURE: 134 MMHG | DIASTOLIC BLOOD PRESSURE: 90 MMHG | BODY MASS INDEX: 42.8 KG/M2 | HEIGHT: 69 IN | WEIGHT: 289 LBS

## 2018-11-26 DIAGNOSIS — I10 ESSENTIAL HYPERTENSION: ICD-10-CM

## 2018-11-26 DIAGNOSIS — I25.5 ISCHEMIC CARDIOMYOPATHY: ICD-10-CM

## 2018-11-26 DIAGNOSIS — I50.23 ACUTE ON CHRONIC SYSTOLIC CHF (CONGESTIVE HEART FAILURE) (HCC): ICD-10-CM

## 2018-11-26 DIAGNOSIS — I25.10 CORONARY ARTERY DISEASE INVOLVING NATIVE CORONARY ARTERY OF NATIVE HEART WITHOUT ANGINA PECTORIS: Primary | ICD-10-CM

## 2018-11-26 PROCEDURE — 1036F TOBACCO NON-USER: CPT | Performed by: INTERNAL MEDICINE

## 2018-11-26 PROCEDURE — G8427 DOCREV CUR MEDS BY ELIG CLIN: HCPCS | Performed by: INTERNAL MEDICINE

## 2018-11-26 PROCEDURE — G8484 FLU IMMUNIZE NO ADMIN: HCPCS | Performed by: INTERNAL MEDICINE

## 2018-11-26 PROCEDURE — G8417 CALC BMI ABV UP PARAM F/U: HCPCS | Performed by: INTERNAL MEDICINE

## 2018-11-26 PROCEDURE — G8598 ASA/ANTIPLAT THER USED: HCPCS | Performed by: INTERNAL MEDICINE

## 2018-11-26 PROCEDURE — 3017F COLORECTAL CA SCREEN DOC REV: CPT | Performed by: INTERNAL MEDICINE

## 2018-11-26 PROCEDURE — 99214 OFFICE O/P EST MOD 30 MIN: CPT | Performed by: INTERNAL MEDICINE

## 2018-11-26 NOTE — COMMUNICATION BODY
Most recent device check 10/19/18 shows normal pacing and sensing functions. He is V paced 91% of the time. Today he reports to feeling good and he has no complaints of chest pain, SOB, palpitations, dizziness, edema, or orthopnea/PND. His weight is increased 30 lbs since January 2018. He reports to walking up and down driveway 1/4 mile each way without difficulty. His sister is present at exam, and she reports he is not living with her now and he has been eating junk food with a lot of [employer]'s. Past Medical History:   has a past medical history of Acute MI ([facility] [address] CAD (coronary artery disease); CHF (congestive heart failure) ([facility] [address] Diabetes ([facility] [address] and Hypertension. Surgical History:   has a past surgical history that includes Cardiac surgery. Social History:   reports that he has never smoked. He has never used smokeless tobacco. He reports that he drinks alcohol. Family History:  family history is not on file. Home Medications:  Prior to Admission medications    Medication Sig Start Date End Date Taking?  Authorizing Provider   spironolactone (ALDACTONE) 25 MG tablet Take 0.5 tablets by mouth daily 10/20/18  Yes Everardo Fernandez MD   irbesartan (AVAPRO) 300 MG tablet Take 1 tablet by mouth nightly 9/12/18  Yes NIXON Luna CNP   insulin glargine (BASAGLAR KWIKPEN) 100 UNIT/ML injection pen Inject 10 Units into the skin nightly Hold BS </= 90   Yes Historical Provider, MD   furosemide (LASIX) 40 MG tablet Take 2 tablets by mouth daily 6/15/18  Yes Tio Kent MD   rosuvastatin (CRESTOR) 20 MG tablet Take 1 tablet by mouth daily 6/15/18  Yes Tio Kent MD   amLODIPine (NORVASC) 10 MG tablet Take 1 tablet by mouth daily 6/13/18  Yes NIXON Luan CNP   aspirin 81 MG chewable tablet Take 1 tablet by mouth daily 4/21/18  Yes NIXON Rueda CNP   glyBURIDE (DIABETA) 5 MG tablet Take 1 tablet by mouth daily (with breakfast) 4/19/18  Yes

## 2018-12-03 ENCOUNTER — HOSPITAL ENCOUNTER (OUTPATIENT)
Age: 63
Discharge: HOME | End: 2018-12-03
Payer: COMMERCIAL

## 2018-12-03 DIAGNOSIS — I10 ESSENTIAL HYPERTENSION: ICD-10-CM

## 2018-12-03 DIAGNOSIS — I25.5 ISCHEMIC CARDIOMYOPATHY: ICD-10-CM

## 2018-12-03 DIAGNOSIS — I25.10 CORONARY ARTERY DISEASE INVOLVING NATIVE CORONARY ARTERY OF NATIVE HEART WITHOUT ANGINA PECTORIS: ICD-10-CM

## 2018-12-03 LAB
A/G RATIO: 1.3 (ref 1.1–2.2)
ALBUMIN SERPL-MCNC: 4.5 G/DL (ref 3.4–5)
ALP BLD-CCNC: 72 U/L (ref 40–129)
ALT SERPL-CCNC: 38 U/L (ref 10–40)
ANION GAP SERPL CALCULATED.3IONS-SCNC: 14 MMOL/L (ref 3–16)
AST SERPL-CCNC: 28 U/L (ref 15–37)
BILIRUB SERPL-MCNC: 0.3 MG/DL (ref 0–1)
BUN BLDV-MCNC: 22 MG/DL (ref 7–20)
CALCIUM SERPL-MCNC: 9.4 MG/DL (ref 8.3–10.6)
CHLORIDE BLD-SCNC: 105 MMOL/L (ref 99–110)
CHOLESTEROL, TOTAL: 201 MG/DL (ref 0–199)
CO2: 22 MMOL/L (ref 21–32)
CREAT SERPL-MCNC: 1.1 MG/DL (ref 0.8–1.3)
GFR AFRICAN AMERICAN: >60
GFR NON-AFRICAN AMERICAN: >60
GLOBULIN: 3.4 G/DL
GLUCOSE BLD-MCNC: 177 MG/DL (ref 70–99)
HDLC SERPL-MCNC: 41 MG/DL (ref 40–60)
LDL CHOLESTEROL CALCULATED: 116 MG/DL
POTASSIUM SERPL-SCNC: 4.5 MMOL/L (ref 3.5–5.1)
SODIUM BLD-SCNC: 141 MMOL/L (ref 136–145)
TOTAL PROTEIN: 7.9 G/DL (ref 6.4–8.2)
TRIGL SERPL-MCNC: 221 MG/DL (ref 0–150)
VLDLC SERPL CALC-MCNC: 44 MG/DL

## 2018-12-03 PROCEDURE — 36415 COLL VENOUS BLD VENIPUNCTURE: CPT

## 2018-12-03 PROCEDURE — 80053 COMPREHEN METABOLIC PANEL: CPT

## 2018-12-03 PROCEDURE — 80061 LIPID PANEL: CPT

## 2018-12-04 DIAGNOSIS — I25.10 CORONARY ARTERY DISEASE INVOLVING NATIVE CORONARY ARTERY OF NATIVE HEART WITHOUT ANGINA PECTORIS: Primary | ICD-10-CM

## 2018-12-04 RX ORDER — ROSUVASTATIN CALCIUM 40 MG/1
40 TABLET, COATED ORAL DAILY
Qty: 90 TABLET | Refills: 0 | Status: SHIPPED | OUTPATIENT
Start: 2018-12-04 | End: 2019-03-13 | Stop reason: SDUPTHER

## 2018-12-04 NOTE — TELEPHONE ENCOUNTER
Created telephone encounter. [facility] requesting a call back to the office. Will relay lab results per SMM once pt calls back.

## 2019-01-22 ENCOUNTER — NURSE ONLY (OUTPATIENT)
Dept: CARDIOLOGY CLINIC | Age: 64
End: 2019-01-22
Payer: COMMERCIAL

## 2019-01-22 DIAGNOSIS — Z95.810 BIVENTRICULAR ICD (IMPLANTABLE CARDIOVERTER-DEFIBRILLATOR) IN PLACE: Primary | ICD-10-CM

## 2019-01-22 DIAGNOSIS — I44.7 LBBB (LEFT BUNDLE BRANCH BLOCK): ICD-10-CM

## 2019-01-22 DIAGNOSIS — I50.23 ACUTE ON CHRONIC SYSTOLIC CHF (CONGESTIVE HEART FAILURE) (HCC): ICD-10-CM

## 2019-01-22 DIAGNOSIS — I25.5 ISCHEMIC CARDIOMYOPATHY: ICD-10-CM

## 2019-01-22 PROCEDURE — 93296 REM INTERROG EVL PM/IDS: CPT | Performed by: INTERNAL MEDICINE

## 2019-01-22 PROCEDURE — 93297 REM INTERROG DEV EVAL ICPMS: CPT | Performed by: INTERNAL MEDICINE

## 2019-01-22 PROCEDURE — 93295 DEV INTERROG REMOTE 1/2/MLT: CPT | Performed by: INTERNAL MEDICINE

## 2019-02-19 RX ORDER — CARVEDILOL 12.5 MG/1
TABLET ORAL
Qty: 270 TABLET | Refills: 5 | Status: SHIPPED | OUTPATIENT
Start: 2019-02-19 | End: 2020-01-27 | Stop reason: SDUPTHER

## 2019-03-13 RX ORDER — ROSUVASTATIN CALCIUM 40 MG/1
40 TABLET, COATED ORAL DAILY
Qty: 90 TABLET | Refills: 5 | Status: SHIPPED | OUTPATIENT
Start: 2019-03-13 | End: 2020-01-27 | Stop reason: SDUPTHER

## 2019-03-15 RX ORDER — TELMISARTAN 80 MG/1
80 TABLET ORAL DAILY
Qty: 30 TABLET | Refills: 3 | Status: SHIPPED | OUTPATIENT
Start: 2019-03-15 | End: 2019-07-01 | Stop reason: SDUPTHER

## 2019-04-02 LAB
ALBUMIN SERPL-MCNC: 4.6 G/DL
ALP BLD-CCNC: 91 U/L
ALT SERPL-CCNC: 63 U/L
ANION GAP SERPL CALCULATED.3IONS-SCNC: 1.6 MMOL/L
AST SERPL-CCNC: 52 U/L
AVERAGE GLUCOSE: 217
BASOPHILS ABSOLUTE: 0 /ΜL
BASOPHILS RELATIVE PERCENT: 0 %
BILIRUB SERPL-MCNC: 0.4 MG/DL (ref 0.1–1.4)
BUN BLDV-MCNC: 32 MG/DL
CALCIUM SERPL-MCNC: 9.7 MG/DL
CHLORIDE BLD-SCNC: 97 MMOL/L
CHOLESTEROL, TOTAL: 173 MG/DL
CHOLESTEROL/HDL RATIO: NORMAL
CO2: 25 MMOL/L
CREAT SERPL-MCNC: 1.53 MG/DL
EOSINOPHILS ABSOLUTE: 0.2 /ΜL
EOSINOPHILS RELATIVE PERCENT: 2 %
GFR CALCULATED: 48
GLUCOSE BLD-MCNC: 238 MG/DL
HBA1C MFR BLD: 9.2 %
HCT VFR BLD CALC: 40.3 % (ref 41–53)
HDLC SERPL-MCNC: 39 MG/DL (ref 35–70)
HEMOGLOBIN: 13.2 G/DL (ref 13.5–17.5)
LDL CHOLESTEROL CALCULATED: 84 MG/DL (ref 0–160)
LYMPHOCYTES ABSOLUTE: 1.7 /ΜL
LYMPHOCYTES RELATIVE PERCENT: 18 %
MCH RBC QN AUTO: 32.3 PG
MCHC RBC AUTO-ENTMCNC: 32.8 G/DL
MCV RBC AUTO: 99 FL
MONOCYTES ABSOLUTE: 0.8 /ΜL
MONOCYTES RELATIVE PERCENT: 9 %
NEUTROPHILS ABSOLUTE: 6.8 /ΜL
NEUTROPHILS RELATIVE PERCENT: 70 %
PLATELET # BLD: 226 K/ΜL
PMV BLD AUTO: ABNORMAL FL
POTASSIUM SERPL-SCNC: 4.7 MMOL/L
RBC # BLD: 4.09 10^6/ΜL
SODIUM BLD-SCNC: 139 MMOL/L
TOTAL PROTEIN: 7.5
TRIGL SERPL-MCNC: 251 MG/DL
VLDLC SERPL CALC-MCNC: 50 MG/DL
WBC # BLD: 9.7 10^3/ML

## 2019-04-04 RX ORDER — FUROSEMIDE 40 MG/1
80 TABLET ORAL DAILY
Qty: 180 TABLET | Refills: 0 | Status: SHIPPED | OUTPATIENT
Start: 2019-04-04 | End: 2019-07-01 | Stop reason: SDUPTHER

## 2019-04-08 ENCOUNTER — HOSPITAL ENCOUNTER (OUTPATIENT)
Dept: ULTRASOUND IMAGING | Age: 64
Discharge: HOME | End: 2019-04-08
Payer: COMMERCIAL

## 2019-04-08 DIAGNOSIS — N18.30 CHRONIC KIDNEY DISEASE, STAGE III (MODERATE) (HCC): ICD-10-CM

## 2019-04-08 PROCEDURE — 76770 US EXAM ABDO BACK WALL COMP: CPT

## 2019-04-23 ENCOUNTER — NURSE ONLY (OUTPATIENT)
Dept: CARDIOLOGY CLINIC | Age: 64
End: 2019-04-23
Payer: COMMERCIAL

## 2019-04-23 DIAGNOSIS — I50.9 NEW ONSET OF CONGESTIVE HEART FAILURE (HCC): ICD-10-CM

## 2019-04-23 DIAGNOSIS — Z95.810 BIVENTRICULAR ICD (IMPLANTABLE CARDIOVERTER-DEFIBRILLATOR) IN PLACE: ICD-10-CM

## 2019-04-23 DIAGNOSIS — I25.5 ISCHEMIC CARDIOMYOPATHY: ICD-10-CM

## 2019-04-23 PROCEDURE — 93295 DEV INTERROG REMOTE 1/2/MLT: CPT | Performed by: INTERNAL MEDICINE

## 2019-04-23 PROCEDURE — 93296 REM INTERROG EVL PM/IDS: CPT | Performed by: INTERNAL MEDICINE

## 2019-04-23 PROCEDURE — 93297 REM INTERROG DEV EVAL ICPMS: CPT | Performed by: INTERNAL MEDICINE

## 2019-04-23 NOTE — LETTER
[address] [facility] [phone]  [address]  [address] [address] [phone]    Pacemaker/Defibrillator Clinic          04/25/19        [address] [facility] [address]        Dear Cinda Clemente    This letter is to inform you that we received the transmission from your monitor at home that checks your pacemaker and/or defibrillator, or implanted heart monitor. The next date your monitor will automatically transmit will be 7/30/19. Your device and monitor are wireless and most transmit cellularly, but please periodically check your monitor is still plugged in to the electrical outlet. If you still use the telephone land line to send please ensure the connection to the phone [email or web address] is secure. This will help to ensure successful automatic transmissions in the future. Also, the monitor needs to be close to you while sleeping at night. Please be aware that the remote device transmission sites are periodically monitored only during regular business hours during which simultaneous in-office device clinics are being run. If your transmission requires attention, we will contact you as soon as possible. Thank you.             Turner

## 2019-04-26 NOTE — PROGRESS NOTES
Carelink transmission shows normal sensing and pacing function. See interrogation for more details. No new arrhythmias. Thoracic impedance trend stable. BVP 96.4%  Follow up in 3 months via carelink.

## 2019-07-01 ENCOUNTER — OFFICE VISIT (OUTPATIENT)
Dept: CARDIOLOGY CLINIC | Age: 64
End: 2019-07-01
Payer: COMMERCIAL

## 2019-07-01 VITALS
SYSTOLIC BLOOD PRESSURE: 114 MMHG | OXYGEN SATURATION: 100 % | DIASTOLIC BLOOD PRESSURE: 68 MMHG | HEART RATE: 64 BPM | WEIGHT: 288 LBS | BODY MASS INDEX: 42.65 KG/M2 | HEIGHT: 69 IN

## 2019-07-01 DIAGNOSIS — I50.23 ACUTE ON CHRONIC SYSTOLIC CHF (CONGESTIVE HEART FAILURE) (HCC): ICD-10-CM

## 2019-07-01 DIAGNOSIS — I10 ESSENTIAL HYPERTENSION: ICD-10-CM

## 2019-07-01 DIAGNOSIS — I25.5 ISCHEMIC CARDIOMYOPATHY: ICD-10-CM

## 2019-07-01 DIAGNOSIS — I25.10 CORONARY ARTERY DISEASE INVOLVING NATIVE CORONARY ARTERY OF NATIVE HEART WITHOUT ANGINA PECTORIS: Primary | ICD-10-CM

## 2019-07-01 PROCEDURE — G8427 DOCREV CUR MEDS BY ELIG CLIN: HCPCS | Performed by: INTERNAL MEDICINE

## 2019-07-01 PROCEDURE — G8417 CALC BMI ABV UP PARAM F/U: HCPCS | Performed by: INTERNAL MEDICINE

## 2019-07-01 PROCEDURE — 3017F COLORECTAL CA SCREEN DOC REV: CPT | Performed by: INTERNAL MEDICINE

## 2019-07-01 PROCEDURE — 99214 OFFICE O/P EST MOD 30 MIN: CPT | Performed by: INTERNAL MEDICINE

## 2019-07-01 PROCEDURE — G8599 NO ASA/ANTIPLAT THER USE RNG: HCPCS | Performed by: INTERNAL MEDICINE

## 2019-07-01 PROCEDURE — 1036F TOBACCO NON-USER: CPT | Performed by: INTERNAL MEDICINE

## 2019-07-01 RX ORDER — TELMISARTAN 80 MG/1
80 TABLET ORAL DAILY
Qty: 90 TABLET | Refills: 3 | Status: SHIPPED | OUTPATIENT
Start: 2019-07-01 | End: 2020-02-04 | Stop reason: SINTOL

## 2019-07-01 RX ORDER — SPIRONOLACTONE 25 MG/1
12.5 TABLET ORAL DAILY
Qty: 90 TABLET | Refills: 3 | Status: SHIPPED | OUTPATIENT
Start: 2019-07-01 | End: 2020-01-27 | Stop reason: SDUPTHER

## 2019-07-01 RX ORDER — FUROSEMIDE 40 MG/1
80 TABLET ORAL DAILY
Qty: 180 TABLET | Refills: 1 | Status: SHIPPED | OUTPATIENT
Start: 2019-07-01 | End: 2020-01-24 | Stop reason: SDUPTHER

## 2019-07-01 NOTE — PROGRESS NOTES
[facility]   Cardiac Followup    Referring Provider:  NIXON Gallo - CNP     Chief Complaint   Patient presents with    6 Month Follow-Up    Congestive Heart Failure    Cardiomyopathy    Coronary Artery Disease    Hypertension    Discuss Labs     in chart     Subjective:  Mr Vidhya Menjivar is being seen today for cardiology follow up of CAD, CMP, CHF, HTN; no complaints today        Past Medical History:  Faustino Kruger a 61 y. o. patient whom I originally met him in hospital 12/4/17. He has PMH of CAD s/p CABG 2009, severe ischemic CM EF=25-30%, s/p BiV-ICD 4/18, remote MI, CRI (follows Dr. Dougie Lund), HLD, and HTN.  Formerly saw Dr. Melody Villalta but no cardiac care for several years. Prior IRVIN in May 2007 showed normal LV function; no vegetation or thrombus. His admit EKG showed NSR, LAE, LVH, nonspecific IVCD, PVC; ST abnormality consider anterolateral ischemia (T wave change from 8/16 EKG). Note ECHO 12/4/17 showed severe LV systolic dysfunction GA=74-79% with multiple WMA's; moderately dilated LV; grade III DD with elevated filling pressure; mild MR/AI; RVE with moderately reduced systolic function. Note pro-BNP elevated >10,000.               Diagnosed with acute on chronic systolic CHF likely due to ischemic CM. Most recent [facility] 12/6/17 no evidence of stress induced ischemia. Large sized anterior,  anteroapical, apical, septal, and inferior fixed defects c/w infarction in the territory of the proximal to distal LAD and/or RCA. Only the lateral wall appears to have decent perfusion; LVEF of 24%. LV cavity mildly  dilated. Findings are c/w severe ischemic cardiomyopathy. Most recent limited ECHO  2/6/18 EF 25-30%. Due to no improvement in EF with CHF med mgt he underwent BiV-ICD placed 4/18/18 with Dr. Tracey Julio. Most recent EKG 10/19/18 NSR; IVCD; consider LHV; Lateral infarct -age undetermined; ST-abnormality consider inferior and lateral ischemia. Renal US 4/8/19 unremarkable.  Most
sensing and pacing function. No new arrhythmias. Thoracic impedance trend stable. BVP 96.4%        History of Present Illness: Today he reports to feeling good and he has no complaints of chest pain, SOB, palpitations, dizziness, edema, or orthopnea/PND. His weight is increased 30 lbs since January 2018. He reports to walking up and down driveway 1/4 mile each way without difficulty. His sister is present at exam, and she reports he is not living with her now and he has been eating junk food with a lot of [employer]'s. Past Medical History:   has a past medical history of Acute MI ([facility] [address] CAD (coronary artery disease), CHF (congestive heart failure) ([facility] [address] Diabetes ([facility] [address] and Hypertension. Surgical History:   has a past surgical history that includes Cardiac surgery. Social History:   reports that he has never smoked. He has never used smokeless tobacco. He reports that he drinks alcohol. Family History:  family history is not on file. Home Medications:  Prior to Admission medications    Medication Sig Start Date End Date Taking?  Authorizing Provider   furosemide (LASIX) 40 MG tablet Take 2 tablets by mouth daily 4/4/19   Pedro Betancur MD   telmisartan (MICARDIS) 80 MG tablet Take 1 tablet by mouth daily 3/15/19   NIXON Eric CNP   rosuvastatin (CRESTOR) 40 MG tablet Take 1 tablet by mouth daily 3/13/19   Pedro Betancur MD   carvedilol (COREG) 12.5 MG tablet 1 tab in the morning and 2 tabs in the evening with food 2/19/19   Pedro Betancur MD   spironolactone (ALDACTONE) 25 MG tablet Take 0.5 tablets by mouth daily 10/20/18   Nohemy Silver MD   irbesartan (AVAPRO) 300 MG tablet Take 1 tablet by mouth nightly 9/12/18   NIXON Eric CNP   insulin glargine (BASAGLAR KWIKPEN) 100 UNIT/ML injection pen Inject 10 Units into the skin nightly Hold BS </= 90    Historical Provider, MD   amLODIPine (NORVASC) 10 MG tablet Take 1 tablet by mouth daily 6/13/18

## 2019-07-30 ENCOUNTER — NURSE ONLY (OUTPATIENT)
Dept: CARDIOLOGY CLINIC | Age: 64
End: 2019-07-30
Payer: COMMERCIAL

## 2019-07-30 DIAGNOSIS — Z95.810 BIVENTRICULAR ICD (IMPLANTABLE CARDIOVERTER-DEFIBRILLATOR) IN PLACE: ICD-10-CM

## 2019-07-30 DIAGNOSIS — I50.23 ACUTE ON CHRONIC SYSTOLIC CHF (CONGESTIVE HEART FAILURE) (HCC): ICD-10-CM

## 2019-07-30 DIAGNOSIS — I25.5 ISCHEMIC CARDIOMYOPATHY: ICD-10-CM

## 2019-07-30 PROCEDURE — 93296 REM INTERROG EVL PM/IDS: CPT | Performed by: INTERNAL MEDICINE

## 2019-07-30 PROCEDURE — 93297 REM INTERROG DEV EVAL ICPMS: CPT | Performed by: INTERNAL MEDICINE

## 2019-07-30 PROCEDURE — 93295 DEV INTERROG REMOTE 1/2/MLT: CPT | Performed by: INTERNAL MEDICINE

## 2019-07-30 NOTE — PROGRESS NOTES
Carelink transmission shows normal sensing and pacing function. See interrogation for more details. Effective BP 95.9%  1 NSVT x 9 beats (coreg)  optivol at baseline. Follow up in 3 months via carelink. See PACEART report under Cardiology tab.

## 2019-08-30 ENCOUNTER — HOSPITAL ENCOUNTER (OUTPATIENT)
Age: 64
Discharge: HOME | End: 2019-08-30
Payer: COMMERCIAL

## 2019-08-30 LAB
BILIRUBIN URINE: NEGATIVE
BLOOD, URINE: ABNORMAL
CLARITY: CLEAR
COLOR: YELLOW
GLUCOSE URINE: 500 MG/DL
KETONES, URINE: NEGATIVE MG/DL
LEUKOCYTE ESTERASE, URINE: NEGATIVE
MICROSCOPIC EXAMINATION: YES
MUCUS: ABNORMAL /LPF
NITRITE, URINE: NEGATIVE
PH UA: 7 (ref 5–8)
PROTEIN UA: ABNORMAL MG/DL
RBC UA: ABNORMAL /HPF (ref 0–2)
SPECIFIC GRAVITY UA: 1.01 (ref 1–1.03)
URINE TYPE: ABNORMAL
UROBILINOGEN, URINE: 1 E.U./DL
WBC UA: ABNORMAL /HPF (ref 0–5)

## 2019-08-30 PROCEDURE — 85027 COMPLETE CBC AUTOMATED: CPT

## 2019-08-30 PROCEDURE — 80048 BASIC METABOLIC PNL TOTAL CA: CPT

## 2019-08-30 PROCEDURE — 84156 ASSAY OF PROTEIN URINE: CPT

## 2019-08-30 PROCEDURE — 82306 VITAMIN D 25 HYDROXY: CPT

## 2019-08-30 PROCEDURE — 82570 ASSAY OF URINE CREATININE: CPT

## 2019-08-30 PROCEDURE — 36415 COLL VENOUS BLD VENIPUNCTURE: CPT

## 2019-08-30 PROCEDURE — 81001 URINALYSIS AUTO W/SCOPE: CPT

## 2019-08-31 LAB
ANION GAP SERPL CALCULATED.3IONS-SCNC: 18 MMOL/L (ref 3–16)
BUN BLDV-MCNC: 25 MG/DL (ref 7–20)
CALCIUM SERPL-MCNC: 9.5 MG/DL (ref 8.3–10.6)
CHLORIDE BLD-SCNC: 100 MMOL/L (ref 99–110)
CO2: 21 MMOL/L (ref 21–32)
CREAT SERPL-MCNC: 1.7 MG/DL (ref 0.8–1.3)
CREATININE URINE: 32.9 MG/DL (ref 39–259)
GFR AFRICAN AMERICAN: 49
GFR NON-AFRICAN AMERICAN: 41
GLUCOSE BLD-MCNC: 285 MG/DL (ref 70–99)
HCT VFR BLD CALC: 35.9 % (ref 40.5–52.5)
HEMOGLOBIN: 12.2 G/DL (ref 13.5–17.5)
MCH RBC QN AUTO: 34.4 PG (ref 26–34)
MCHC RBC AUTO-ENTMCNC: 34.1 G/DL (ref 31–36)
MCV RBC AUTO: 100.9 FL (ref 80–100)
PDW BLD-RTO: 15 % (ref 12.4–15.4)
PLATELET # BLD: 236 K/UL (ref 135–450)
PMV BLD AUTO: 8.5 FL (ref 5–10.5)
POTASSIUM SERPL-SCNC: 4.7 MMOL/L (ref 3.5–5.1)
PROTEIN PROTEIN: 13 MG/DL
RBC # BLD: 3.56 M/UL (ref 4.2–5.9)
SODIUM BLD-SCNC: 139 MMOL/L (ref 136–145)
VITAMIN D 25-HYDROXY: 22.6 NG/ML
WBC # BLD: 6.4 K/UL (ref 4–11)

## 2019-10-29 ENCOUNTER — NURSE ONLY (OUTPATIENT)
Dept: CARDIOLOGY CLINIC | Age: 64
End: 2019-10-29
Payer: COMMERCIAL

## 2019-10-29 DIAGNOSIS — I50.23 ACUTE ON CHRONIC SYSTOLIC CHF (CONGESTIVE HEART FAILURE) (HCC): ICD-10-CM

## 2019-10-29 DIAGNOSIS — Z95.810 BIVENTRICULAR ICD (IMPLANTABLE CARDIOVERTER-DEFIBRILLATOR) IN PLACE: ICD-10-CM

## 2019-10-29 DIAGNOSIS — I25.5 ISCHEMIC CARDIOMYOPATHY: ICD-10-CM

## 2019-10-29 PROCEDURE — 93295 DEV INTERROG REMOTE 1/2/MLT: CPT | Performed by: INTERNAL MEDICINE

## 2019-10-29 PROCEDURE — 93296 REM INTERROG EVL PM/IDS: CPT | Performed by: INTERNAL MEDICINE

## 2019-10-29 PROCEDURE — 93297 REM INTERROG DEV EVAL ICPMS: CPT | Performed by: INTERNAL MEDICINE

## 2020-01-24 RX ORDER — FUROSEMIDE 40 MG/1
80 TABLET ORAL DAILY
Qty: 180 TABLET | Refills: 3 | Status: SHIPPED | OUTPATIENT
Start: 2020-01-24 | End: 2021-01-14

## 2020-01-27 ENCOUNTER — OFFICE VISIT (OUTPATIENT)
Dept: CARDIOLOGY CLINIC | Age: 65
End: 2020-01-27
Payer: COMMERCIAL

## 2020-01-27 VITALS
BODY MASS INDEX: 40.85 KG/M2 | SYSTOLIC BLOOD PRESSURE: 100 MMHG | OXYGEN SATURATION: 98 % | HEIGHT: 69 IN | DIASTOLIC BLOOD PRESSURE: 80 MMHG | WEIGHT: 275.8 LBS | HEART RATE: 75 BPM

## 2020-01-27 PROCEDURE — G8417 CALC BMI ABV UP PARAM F/U: HCPCS | Performed by: INTERNAL MEDICINE

## 2020-01-27 PROCEDURE — 3017F COLORECTAL CA SCREEN DOC REV: CPT | Performed by: INTERNAL MEDICINE

## 2020-01-27 PROCEDURE — 99214 OFFICE O/P EST MOD 30 MIN: CPT | Performed by: INTERNAL MEDICINE

## 2020-01-27 PROCEDURE — G8427 DOCREV CUR MEDS BY ELIG CLIN: HCPCS | Performed by: INTERNAL MEDICINE

## 2020-01-27 PROCEDURE — 1036F TOBACCO NON-USER: CPT | Performed by: INTERNAL MEDICINE

## 2020-01-27 PROCEDURE — G8484 FLU IMMUNIZE NO ADMIN: HCPCS | Performed by: INTERNAL MEDICINE

## 2020-01-27 RX ORDER — SPIRONOLACTONE 25 MG/1
12.5 TABLET ORAL DAILY
Qty: 90 TABLET | Refills: 1 | Status: SHIPPED | OUTPATIENT
Start: 2020-01-27 | End: 2020-08-11 | Stop reason: SDUPTHER

## 2020-01-27 RX ORDER — CARVEDILOL 12.5 MG/1
TABLET ORAL
Qty: 270 TABLET | Refills: 5 | Status: SHIPPED | OUTPATIENT
Start: 2020-01-27 | End: 2021-01-26 | Stop reason: ALTCHOICE

## 2020-01-27 RX ORDER — ROSUVASTATIN CALCIUM 40 MG/1
40 TABLET, COATED ORAL DAILY
Qty: 90 TABLET | Refills: 3 | Status: SHIPPED | OUTPATIENT
Start: 2020-01-27 | End: 2021-01-26 | Stop reason: SDUPTHER

## 2020-01-27 NOTE — PATIENT INSTRUCTIONS
Plan:  1. Meds reviewed. Refills as warranted  2. Will check CMP, CBC, lipids   3. No changes today. Continue current medications.    4. Follow up with me in 6 months

## 2020-01-27 NOTE — LETTER
and started Micardis ([employer]). His sister Gaston Mccabe is present in office today.        Past Medical History:   has a past medical history of Acute MI ([facility] [address] CAD (coronary artery disease), CHF (congestive heart failure) ([facility] [address] Diabetes ([facility] [address] and Hypertension. Surgical History:   has a past surgical history that includes Cardiac surgery. Social History:   reports that he has never smoked. He has never used smokeless tobacco. He reports previous alcohol use. He reports that he does not use drugs. Family History:  family history is not on file. Home Medications:  Prior to Admission medications    Medication Sig Start Date End Date Taking?  Authorizing Provider   empagliflozin (JARDIANCE) 10 MG tablet Take 10 mg by mouth daily   Yes Historical Provider, MD   furosemide (LASIX) 40 MG tablet Take 2 tablets by mouth daily 1/24/20  Yes Jannis Cranker, MD   spironolactone (ALDACTONE) 25 MG tablet Take 0.5 tablets by mouth daily 7/1/19  Yes Jannis Cranker, MD   telmisartan (MICARDIS) 80 MG tablet Take 1 tablet by mouth daily 7/1/19  Yes Jannis Cranker, MD   rosuvastatin (CRESTOR) 40 MG tablet Take 1 tablet by mouth daily 3/13/19  Yes Jannis Cranker, MD   carvedilol (COREG) 12.5 MG tablet 1 tab in the morning and 2 tabs in the evening with food 2/19/19  Yes Jannis Cranker, MD   irbesartan (AVAPRO) 300 MG tablet Take 1 tablet by mouth nightly 9/12/18  Yes NIXON Villarreal CNP   insulin glargine (BASAGLAR KWIKPEN) 100 UNIT/ML injection pen Inject 10 Units into the skin nightly Hold BS </= 90   Yes Historical Provider, MD   aspirin 81 MG chewable tablet Take 1 tablet by mouth daily 4/21/18  Yes NIXON Saini CNP   glyBURIDE (DIABETA) 5 MG tablet Take 1 tablet by mouth daily (with breakfast) 4/19/18  Yes Jose A Sheriff MD   metFORMIN (GLUCOPHAGE) 500 MG tablet Take 1 tablet by mouth 2 times daily (with meals)  Patient taking differently: Take 1,000 mg by mouth 2 times daily (with meals) 2 tab BID 4/17/18  Yes Jono Hoyos MD      Allergies:  Patient has no known allergies. Review of Systems:   · Constitutional: there has been no unanticipated weight loss. There's been no change in energy level, sleep pattern, or activity level. · Eyes: No visual changes or diplopia. No scleral icterus. · ENT: No Headaches, hearing loss or vertigo. No mouth sores or sore throat. · Cardiovascular: Reviewed in HPI  · Respiratory: No cough or wheezing, no sputum production. No hematemesis. · Gastrointestinal: No abdominal pain, appetite loss, blood in stools. No change in bowel or bladder habits. · Genitourinary: No dysuria, trouble voiding, or hematuria. · Musculoskeletal:  No gait disturbance, weakness or joint complaints. · Integumentary: No rash or pruritis. · Neurological: No headache, diplopia, change in muscle strength, numbness or tingling. No change in gait, balance, coordination, mood, affect, memory, mentation, behavior. · Psychiatric: No anxiety, no depression. · Endocrine: No malaise, fatigue or temperature intolerance. No excessive thirst, fluid intake, or urination. No tremor. · Hematologic/Lymphatic: No abnormal bruising or bleeding, blood clots or swollen lymph nodes. · Allergic/Immunologic: No nasal congestion or hives. Physical Examination:    Vitals:    01/27/20 1415   BP: 100/80   Pulse: 75   SpO2: 98%        Constitutional and General Appearance: NAD   Respiratory:  · Normal excursion and expansion without use of accessory muscles  · Resp Auscultation: Normal breath sounds without dullness  Cardiovascular:  · The apical impulses not displaced  · Heart tones are crisp and normal  · Cervical veins are not engorged  · The carotid upstroke is normal in amplitude and contour without delay or bruit  · Normal S1S2, No S3, ?soft IRASEMA  · Peripheral pulses are symmetrical and full  · There is no clubbing, cyanosis of the extremities.   · Trace BLE edema · Femoral Arteries: 2+ and equal  · Pedal Pulses: 2+ and equal   Abdomen:  · No masses or tenderness  · Liver/Spleen: No Abnormalities Noted  Neurological/Psychiatric:  · Alert and oriented in all spheres  · Moves all extremities well  · Exhibits normal gait balance and coordination  · No abnormalities of mood, affect, memory, mentation, or behavior are noted        Skin: warm and dry    Lab Results   Component Value Date    CHOL 173 04/02/2019    CHOL 201 (H) 12/03/2018     Lab Results   Component Value Date    TRIG 251 04/02/2019    TRIG 221 (H) 12/03/2018     Lab Results   Component Value Date    HDL 39 04/02/2019    HDL 41 12/03/2018    HDL 39 (L) 04/13/2018     Lab Results   Component Value Date    LDLCALC 84 04/02/2019    LDLCALC 116 (H) 12/03/2018    LDLCALC see below 04/13/2018     Lab Results   Component Value Date    LABVLDL 44 12/03/2018    LABVLDL see below 04/13/2018    LABVLDL 53 02/14/2018    VLDL 50 04/02/2019     SN=518 and GN=885    Assessment:     1. Coronary artery disease involving native heart without angina pectoris, unspecified vessel or lesion type:  Most recent Lexiscan 12/6/17 no evidence of stress induced ischemia. Large sized anterior,  anteroapical, apical, septal, and inferior fixed defects consistent with  infarction in the territory of the proximal to distal LAD and/or RCA. Only  the lateral wall appears to have decent perfusion; LVEF 24%. There are no concerning symptoms for angina currently. 2. Chronic systolic CHF (congestive heart failure) ([facility] [address]):   Clinically compensated NYHA Class I and will continue current CHF medical regimen. Most recent limited ECHO  2/6/18 EF 25-30%. Due to no improvement in EF with CHF med mgt he underwent BiV-ICD placed 4/18/18 with Dr. Vibha Bishop. Most recent most device check 10/29/19 Carelink transmission shows normal sensing and pacing function. BP 97.1%. No new arrhythmias.  Estimated battery life 7.6 yrs 3. Ischemic cardiomyopathy: See #1 and 2 above     4. Essential hypertension Stable and well controlled and will continue present medical regimen: Well controlled and will continue current medical regimen. 5.      Lipids: I personally reviewed most recent from 4/2/19 (see above). Good overall except for elevated TG. I strongly encouraged better diet and taking meds as prescribed. Need to recheck. Plan:  1. Meds reviewed. Refills as warranted  2. Will check CMP, CBC, lipids   3. No changes today. Continue current medications. 4. Follow up with me in 6 months     This note was scribed in the presence of Nikolay Yuan MD by Estevan Mckinley RN    I, Dr. Jailyn Ibarra, personally performed the services described in this documentation, as scribed by the above signed scribe in my presence. It is both accurate and complete to my knowledge. I agree with the details independently gathered by the clinical support staff, while the remaining scribed note accurately describes my personal service to the patient. Cost of prescription medications and patient compliance have been reviewed with patient. All questions answered. Thank you for allowing me to participate in the care of this individual.    Carol Rincon.  Keyona Duenas M.D., [facility]

## 2020-01-27 NOTE — PROGRESS NOTES
Pulses: 2+ and equal   Abdomen:  · No masses or tenderness  · Liver/Spleen: No Abnormalities Noted  Neurological/Psychiatric:  · Alert and oriented in all spheres  · Moves all extremities well  · Exhibits normal gait balance and coordination  · No abnormalities of mood, affect, memory, mentation, or behavior are noted        Skin: warm and dry    Lab Results   Component Value Date    CHOL 173 04/02/2019    CHOL 201 (H) 12/03/2018     Lab Results   Component Value Date    TRIG 251 04/02/2019    TRIG 221 (H) 12/03/2018     Lab Results   Component Value Date    HDL 39 04/02/2019    HDL 41 12/03/2018    HDL 39 (L) 04/13/2018     Lab Results   Component Value Date    LDLCALC 84 04/02/2019    LDLCALC 116 (H) 12/03/2018    LDLCALC see below 04/13/2018     Lab Results   Component Value Date    LABVLDL 44 12/03/2018    LABVLDL see below 04/13/2018    LABVLDL 53 02/14/2018    VLDL 50 04/02/2019     LK=055 and PI=911    Assessment:     1. Coronary artery disease involving native heart without angina pectoris, unspecified vessel or lesion type:  Most recent Lexiscan 12/6/17 no evidence of stress induced ischemia. Large sized anterior,  anteroapical, apical, septal, and inferior fixed defects consistent with  infarction in the territory of the proximal to distal LAD and/or RCA. Only  the lateral wall appears to have decent perfusion; LVEF 24%. There are no concerning symptoms for angina currently. 2. Chronic systolic CHF (congestive heart failure) ([address]):   Clinically compensated NYHA Class I and will continue current CHF medical regimen. Most recent limited ECHO  2/6/18 EF 25-30%. Due to no improvement in EF with CHF med mgt he underwent BiV-ICD placed 4/18/18 with Dr. Gary Soto. Most recent most device check 10/29/19 Carelink transmission shows normal sensing and pacing function. BP 97.1%. No new arrhythmias. Estimated battery life 7.6 yrs     3. Ischemic cardiomyopathy: See #1 and 2 above     4.  Essential hypertension

## 2020-01-28 ENCOUNTER — TELEPHONE (OUTPATIENT)
Dept: CARDIOLOGY CLINIC | Age: 65
End: 2020-01-28

## 2020-01-28 ENCOUNTER — NURSE ONLY (OUTPATIENT)
Dept: CARDIOLOGY CLINIC | Age: 65
End: 2020-01-28
Payer: COMMERCIAL

## 2020-01-28 PROCEDURE — 93295 DEV INTERROG REMOTE 1/2/MLT: CPT | Performed by: INTERNAL MEDICINE

## 2020-01-28 PROCEDURE — 93296 REM INTERROG EVL PM/IDS: CPT | Performed by: INTERNAL MEDICINE

## 2020-01-28 PROCEDURE — 93297 REM INTERROG DEV EVAL ICPMS: CPT | Performed by: INTERNAL MEDICINE

## 2020-01-28 NOTE — LETTER
[address] [facility] [phone]  [address] [facility]  [address] [address] [phone]    Pacemaker/Defibrillator Clinic          01/28/20        [address] [facility] [address]        Dear Susan Rosa    This letter is to inform you that we received the transmission from your monitor at home that checks your implanted heart device. The next date your monitor will automatically transmit will be 4/29. If your report needs attention we will notify you. Your device and monitor are wireless and most transmit cellularly, but please periodically check your monitor is still plugged in to the electrical outlet. If you still use the telephone land line to send please ensure the connection to the phone jyotsna is secure. This will help to ensure successful automatic transmissions in the future. Also, the monitor needs to be close to you while sleeping at night. Please be aware that the remote device transmission sites are periodically monitored only during regular business hours during which simultaneous in-office device clinics are being run. If your transmission requires attention, we will contact you as soon as possible. Thank you.             [address]

## 2020-01-30 NOTE — TELEPHONE ENCOUNTER
Message left for patient to call back. When he calls back, se what his daily weights have been. Ask if he has any SOB or swelling.

## 2020-01-30 NOTE — TELEPHONE ENCOUNTER
Spoke with sister and patient. Both state he has no sob or swelling. I asked him to weigh himself and it was 275lbs. , which is the same weight from visit with Dr. Brenda Abebe.

## 2020-01-30 NOTE — TELEPHONE ENCOUNTER
If weight increased 3-4# or increased SOB or leg swelling it's OK to take extra lasix tablet for 2-3 days PRN.  If feeling OK and weight steady then CPM.

## 2020-02-03 ENCOUNTER — HOSPITAL ENCOUNTER (OUTPATIENT)
Age: 65
Discharge: HOME | End: 2020-02-03
Payer: COMMERCIAL

## 2020-02-03 LAB
A/G RATIO: 1.3 (ref 1.1–2.2)
ALBUMIN SERPL-MCNC: 4.5 G/DL (ref 3.4–5)
ALP BLD-CCNC: 79 U/L (ref 40–129)
ALT SERPL-CCNC: 28 U/L (ref 10–40)
ANION GAP SERPL CALCULATED.3IONS-SCNC: 17 MMOL/L (ref 3–16)
AST SERPL-CCNC: 33 U/L (ref 15–37)
BILIRUB SERPL-MCNC: 0.4 MG/DL (ref 0–1)
BUN BLDV-MCNC: 48 MG/DL (ref 7–20)
CALCIUM SERPL-MCNC: 9.9 MG/DL (ref 8.3–10.6)
CHLORIDE BLD-SCNC: 99 MMOL/L (ref 99–110)
CHOLESTEROL, TOTAL: 138 MG/DL (ref 0–199)
CO2: 24 MMOL/L (ref 21–32)
CREAT SERPL-MCNC: 3 MG/DL (ref 0.8–1.3)
GFR AFRICAN AMERICAN: 26
GFR NON-AFRICAN AMERICAN: 21
GLOBULIN: 3.5 G/DL
GLUCOSE BLD-MCNC: 167 MG/DL (ref 70–99)
HCT VFR BLD CALC: 34.6 % (ref 40.5–52.5)
HDLC SERPL-MCNC: 32 MG/DL (ref 40–60)
HEMOGLOBIN: 11.6 G/DL (ref 13.5–17.5)
LDL CHOLESTEROL CALCULATED: 51 MG/DL
MCH RBC QN AUTO: 34.3 PG (ref 26–34)
MCHC RBC AUTO-ENTMCNC: 33.4 G/DL (ref 31–36)
MCV RBC AUTO: 102.6 FL (ref 80–100)
PDW BLD-RTO: 14 % (ref 12.4–15.4)
PLATELET # BLD: 192 K/UL (ref 135–450)
PMV BLD AUTO: 9 FL (ref 5–10.5)
POTASSIUM SERPL-SCNC: 4.2 MMOL/L (ref 3.5–5.1)
RBC # BLD: 3.37 M/UL (ref 4.2–5.9)
SODIUM BLD-SCNC: 140 MMOL/L (ref 136–145)
TOTAL PROTEIN: 8 G/DL (ref 6.4–8.2)
TRIGL SERPL-MCNC: 276 MG/DL (ref 0–150)
VLDLC SERPL CALC-MCNC: 55 MG/DL
WBC # BLD: 7.7 K/UL (ref 4–11)

## 2020-02-03 PROCEDURE — 80053 COMPREHEN METABOLIC PANEL: CPT

## 2020-02-03 PROCEDURE — 85027 COMPLETE CBC AUTOMATED: CPT

## 2020-02-03 PROCEDURE — 80061 LIPID PANEL: CPT

## 2020-02-03 PROCEDURE — 36415 COLL VENOUS BLD VENIPUNCTURE: CPT

## 2020-02-04 ENCOUNTER — TELEPHONE (OUTPATIENT)
Dept: CARDIOLOGY CLINIC | Age: 65
End: 2020-02-04

## 2020-03-09 ENCOUNTER — HOSPITAL ENCOUNTER (OUTPATIENT)
Age: 65
Discharge: HOME | End: 2020-03-09
Payer: COMMERCIAL

## 2020-03-09 LAB
ALBUMIN SERPL-MCNC: 4.7 G/DL (ref 3.4–5)
ANION GAP SERPL CALCULATED.3IONS-SCNC: 15 MMOL/L (ref 3–16)
BUN BLDV-MCNC: 31 MG/DL (ref 7–20)
CALCIUM SERPL-MCNC: 9.7 MG/DL (ref 8.3–10.6)
CHLORIDE BLD-SCNC: 95 MMOL/L (ref 99–110)
CO2: 26 MMOL/L (ref 21–32)
CREAT SERPL-MCNC: 1.9 MG/DL (ref 0.8–1.3)
GFR AFRICAN AMERICAN: 43
GFR NON-AFRICAN AMERICAN: 36
GLUCOSE BLD-MCNC: 158 MG/DL (ref 70–99)
PHOSPHORUS: 3.4 MG/DL (ref 2.5–4.9)
POTASSIUM SERPL-SCNC: 3.6 MMOL/L (ref 3.5–5.1)
SODIUM BLD-SCNC: 136 MMOL/L (ref 136–145)

## 2020-03-09 PROCEDURE — 80069 RENAL FUNCTION PANEL: CPT

## 2020-03-09 PROCEDURE — 36415 COLL VENOUS BLD VENIPUNCTURE: CPT

## 2020-04-23 ENCOUNTER — TELEPHONE (OUTPATIENT)
Dept: CARDIOLOGY CLINIC | Age: 65
End: 2020-04-23

## 2020-04-24 NOTE — TELEPHONE ENCOUNTER
Yes, OK with me to take viagra PNR as long as NO NTG  SL or imdur 48 hours prior to using drug. I do not see this med on his list but please make sure he is not taking nitro and understands these instructions. During 1/20 OV I was told he is taking micardis (telmisartan) which is an ARB for LV dysfunction. I do not see on current medication list. Please confirm because he should be on ARB or ACE-I in addition to his coreg and aldactone for CHF. Thanks.

## 2020-04-27 ENCOUNTER — TELEPHONE (OUTPATIENT)
Dept: CARDIOLOGY CLINIC | Age: 65
End: 2020-04-27

## 2020-04-27 RX ORDER — TELMISARTAN 80 MG/1
80 TABLET ORAL DAILY
Qty: 30 TABLET | Refills: 3
Start: 2020-04-27 | End: 2020-06-30

## 2020-04-28 NOTE — TELEPHONE ENCOUNTER
Norvasc 5 mg ordered and pending. BMP ordered. Pt's neighbor Heriberto (caregiver) is aware of changes.

## 2020-04-29 ENCOUNTER — HOSPITAL ENCOUNTER (OUTPATIENT)
Age: 65
Discharge: HOME | End: 2020-04-29
Payer: COMMERCIAL

## 2020-04-29 ENCOUNTER — NURSE ONLY (OUTPATIENT)
Dept: CARDIOLOGY CLINIC | Age: 65
End: 2020-04-29
Payer: COMMERCIAL

## 2020-04-29 LAB
ANION GAP SERPL CALCULATED.3IONS-SCNC: 16 MMOL/L (ref 3–16)
BUN BLDV-MCNC: 32 MG/DL (ref 7–20)
CALCIUM SERPL-MCNC: 10.2 MG/DL (ref 8.3–10.6)
CHLORIDE BLD-SCNC: 94 MMOL/L (ref 99–110)
CO2: 30 MMOL/L (ref 21–32)
CREAT SERPL-MCNC: 1.5 MG/DL (ref 0.8–1.3)
GFR AFRICAN AMERICAN: 57
GFR NON-AFRICAN AMERICAN: 47
GLUCOSE BLD-MCNC: 168 MG/DL (ref 70–99)
POTASSIUM SERPL-SCNC: 4.1 MMOL/L (ref 3.5–5.1)
SODIUM BLD-SCNC: 140 MMOL/L (ref 136–145)

## 2020-04-29 PROCEDURE — 80048 BASIC METABOLIC PNL TOTAL CA: CPT

## 2020-04-29 PROCEDURE — 93296 REM INTERROG EVL PM/IDS: CPT | Performed by: INTERNAL MEDICINE

## 2020-04-29 PROCEDURE — 36415 COLL VENOUS BLD VENIPUNCTURE: CPT

## 2020-04-29 PROCEDURE — 93295 DEV INTERROG REMOTE 1/2/MLT: CPT | Performed by: INTERNAL MEDICINE

## 2020-04-29 RX ORDER — AMLODIPINE BESYLATE 5 MG/1
5 TABLET ORAL DAILY
Qty: 90 TABLET | Refills: 3 | Status: SHIPPED | OUTPATIENT
Start: 2020-04-29 | End: 2021-01-26 | Stop reason: SDUPTHER

## 2020-05-22 ENCOUNTER — HOSPITAL ENCOUNTER (OUTPATIENT)
Age: 65
Discharge: HOME | End: 2020-05-22
Payer: COMMERCIAL

## 2020-05-22 LAB
A/G RATIO: 1.3 (ref 1.1–2.2)
ALBUMIN SERPL-MCNC: 4.7 G/DL (ref 3.4–5)
ALP BLD-CCNC: 92 U/L (ref 40–129)
ALT SERPL-CCNC: 33 U/L (ref 10–40)
ANION GAP SERPL CALCULATED.3IONS-SCNC: 15 MMOL/L (ref 3–16)
AST SERPL-CCNC: 55 U/L (ref 15–37)
BILIRUB SERPL-MCNC: 0.5 MG/DL (ref 0–1)
BUN BLDV-MCNC: 33 MG/DL (ref 7–20)
CALCIUM SERPL-MCNC: 10.1 MG/DL (ref 8.3–10.6)
CHLORIDE BLD-SCNC: 98 MMOL/L (ref 99–110)
CHOLESTEROL, TOTAL: 144 MG/DL (ref 0–199)
CO2: 26 MMOL/L (ref 21–32)
CREAT SERPL-MCNC: 1.5 MG/DL (ref 0.8–1.3)
GFR AFRICAN AMERICAN: 57
GFR NON-AFRICAN AMERICAN: 47
GLOBULIN: 3.7 G/DL
GLUCOSE BLD-MCNC: 143 MG/DL (ref 70–99)
HDLC SERPL-MCNC: 35 MG/DL (ref 40–60)
LDL CHOLESTEROL CALCULATED: 58 MG/DL
POTASSIUM SERPL-SCNC: 3.5 MMOL/L (ref 3.5–5.1)
SODIUM BLD-SCNC: 139 MMOL/L (ref 136–145)
TOTAL PROTEIN: 8.4 G/DL (ref 6.4–8.2)
TRIGL SERPL-MCNC: 256 MG/DL (ref 0–150)
VLDLC SERPL CALC-MCNC: 51 MG/DL

## 2020-05-22 PROCEDURE — 80061 LIPID PANEL: CPT

## 2020-05-22 PROCEDURE — 83036 HEMOGLOBIN GLYCOSYLATED A1C: CPT

## 2020-05-22 PROCEDURE — 36415 COLL VENOUS BLD VENIPUNCTURE: CPT

## 2020-05-22 PROCEDURE — 80053 COMPREHEN METABOLIC PANEL: CPT

## 2020-05-23 LAB
ESTIMATED AVERAGE GLUCOSE: 205.9 MG/DL
HBA1C MFR BLD: 8.8 %

## 2020-06-30 RX ORDER — TELMISARTAN 80 MG/1
TABLET ORAL
Qty: 90 TABLET | Refills: 2 | Status: SHIPPED | OUTPATIENT
Start: 2020-06-30 | End: 2021-01-26 | Stop reason: SDUPTHER

## 2020-06-30 NOTE — TELEPHONE ENCOUNTER
Last OV with ETHEL 01/2020. Next scheduled OV with [facility] 7/27/20.   TY

## 2020-07-29 ENCOUNTER — NURSE ONLY (OUTPATIENT)
Dept: CARDIOLOGY CLINIC | Age: 65
End: 2020-07-29
Payer: COMMERCIAL

## 2020-07-29 PROCEDURE — 93295 DEV INTERROG REMOTE 1/2/MLT: CPT | Performed by: INTERNAL MEDICINE

## 2020-07-29 PROCEDURE — 93296 REM INTERROG EVL PM/IDS: CPT | Performed by: INTERNAL MEDICINE

## 2020-07-29 NOTE — PROGRESS NOTES
We received remote transmission from patient's monitor at home. Transmission shows normal sensing and pacing function. EP physician will review. See interrogation under cardiology tab in the [address] [place] [address] [address] field for more details. Total * 97.0% (MVP Off)  Effective 96.9%. No  arrhythmias recorded. Optivol @ baseline. Follow up in 3 months via carelink.

## 2020-08-11 ENCOUNTER — OFFICE VISIT (OUTPATIENT)
Dept: CARDIOLOGY CLINIC | Age: 65
End: 2020-08-11
Payer: COMMERCIAL

## 2020-08-11 VITALS
BODY MASS INDEX: 37.92 KG/M2 | SYSTOLIC BLOOD PRESSURE: 118 MMHG | DIASTOLIC BLOOD PRESSURE: 80 MMHG | TEMPERATURE: 97.1 F | WEIGHT: 256 LBS | HEART RATE: 70 BPM | OXYGEN SATURATION: 98 % | HEIGHT: 69 IN

## 2020-08-11 PROCEDURE — G8417 CALC BMI ABV UP PARAM F/U: HCPCS | Performed by: INTERNAL MEDICINE

## 2020-08-11 PROCEDURE — 99214 OFFICE O/P EST MOD 30 MIN: CPT | Performed by: INTERNAL MEDICINE

## 2020-08-11 PROCEDURE — 1036F TOBACCO NON-USER: CPT | Performed by: INTERNAL MEDICINE

## 2020-08-11 PROCEDURE — 3017F COLORECTAL CA SCREEN DOC REV: CPT | Performed by: INTERNAL MEDICINE

## 2020-08-11 PROCEDURE — G8427 DOCREV CUR MEDS BY ELIG CLIN: HCPCS | Performed by: INTERNAL MEDICINE

## 2020-08-11 RX ORDER — SPIRONOLACTONE 25 MG/1
12.5 TABLET ORAL DAILY
Qty: 90 TABLET | Refills: 3 | Status: SHIPPED | OUTPATIENT
Start: 2020-08-11 | End: 2021-01-26 | Stop reason: SDUPTHER

## 2020-08-11 NOTE — PROGRESS NOTES
[facility]   Cardiac Followup    Referring Provider:  NIXON Isaacs CNP     Chief Complaint   Patient presents with    6 Month Follow-Up     Ischemic Cardiomyopathy, HTN    Other     No complaints on a cardiac standpoint     Subjective:  Mr Rico Julio is being seen today for cardiology follow up of CAD, CMP, CHF, HTN; no complaints today        Past Medical History:  Luna Loza a 59 y. o. patient who I originally saw as inpatient consult 12/4/17. He has PMH of CAD s/p CABG 2009, severe ischemic CM EF=25-30%, s/p BiV-ICD 4/18, remote MI, CRI (follows Dr. Nathan Palafox), HLD, and HTN.  Formerly saw Dr. Leland Bellamy but no cardiac care for years prior. Prior IRVIN in May 2007 normal.  Note ECHO 12/4/17 showed LVEF=25-30% with multiple WMA's; moderately dilated LV; grade III DD with elevated filling pressure; mild MR/AI; RVE with moderately reduced systolic function.              Diagnosed with acute on chronic systolic CHF due to ischemic CM and medication noncompliance. Most recent [facility] 12/6/17 no evidence of stress induced ischemia. Large sized anterior,  anteroapical, apical, septal, and inferior fixed defects c/w infarction. Only the lateral wall appears to have decent perfusion; LVEF of 24%. LV cavity mildly dilated. Most recent limited ECHO  2/6/18 EF 25-30%. Due to no improvement in EF with CHF med mgt he underwent BiV-ICD placed 4/18/18 with Dr. Will Alcantara. Note EKG 10/19/18 NSR; IVCD; consider LHV; Lateral infarct -age undetermined; ST-abnormality consider inferior and lateral ischemia. Renal US 4/8/19 unremarkable. Navdeep Bunn CNP stopped Avapro (Irbesartan) and started Micardis (Syliva Crispin). Most recent device check 7/29/20 Transmission shows normal sensing and pacing function. Total * 97.0% (MVP Off) Effective 96.9%. No  arrhythmias recorded. Optivol @ baseline (battery life in 10/19 = 7.6 years). HPI:   Today he reports feeling good no new complaints.   States water pill sometimes makes him leak urine if he doesn't get to bathroom in time. Denies chest pain, shortness of breath, edema, dizziness, palpitations and syncope. He performing daily activities without complication. He also reports he is having problems with erections and wants to know if he can take Viagra safely with his heart condition. He does NOT use NTG.    Past Medical History:   has a past medical history of Acute MI ([facility] [address] CAD (coronary artery disease), [facility] and Hypertension. Surgical History:   has a past surgical history that includes Cardiac surgery. Social History:   reports that he has never smoked. He has never used smokeless tobacco. He reports previous alcohol use. He reports that he does not use drugs. Family History:  family history is not on file. Home Medications:  Prior to Admission medications    Medication Sig Start Date End Date Taking?  Authorizing Provider   vitamin D (CHOLECALCIFEROL) 25 MCG (1000 UT) TABS tablet Take 1,000 Units by mouth daily   Yes Historical Provider, MD   telmisartan (MICARDIS) 80 MG tablet TAKE ONE TABLET BY MOUTH DAILY 6/30/20  Yes Angie Olivares MD   amLODIPine (NORVASC) 5 MG tablet Take 1 tablet by mouth daily 4/29/20  Yes Angie Olivares MD   empagliflozin (JARDIANCE) 10 MG tablet Take 10 mg by mouth daily   Yes Historical Provider, MD   spironolactone (ALDACTONE) 25 MG tablet Take 0.5 tablets by mouth daily 1/27/20  Yes Angie Olivares MD   rosuvastatin (CRESTOR) 40 MG tablet Take 1 tablet by mouth daily 1/27/20  Yes Angie Olivares MD   carvedilol (COREG) 12.5 MG tablet 1 tab in the morning and 2 tabs in the evening with food 1/27/20  Yes Angie Olivares MD   furosemide (LASIX) 40 MG tablet Take 2 tablets by mouth daily 1/24/20  Yes Angie Olivares MD   insulin glargine [facility]) 100 UNIT/ML injection pen Inject 10 Units into the skin nightly Hold BS </= 90   Yes Historical Provider, MD   aspirin 81 MG chewable tablet Take 1 tablet by mouth daily 4/21/18  Yes NIXON Pearl CNP   glyBURIDE (DIABETA) 5 MG tablet Take 1 tablet by mouth daily (with breakfast) 4/19/18  Yes Christy Jimenez MD   metFORMIN (GLUCOPHAGE) 500 MG tablet Take 1 tablet by mouth 2 times daily (with meals)  Patient taking differently: Take 1,000 mg by mouth 2 times daily (with meals) 2 tab BID 4/17/18  Yes Christy Jimenez MD      Allergies:  Patient has no known allergies. Review of Systems:   · Constitutional: there has been no unanticipated weight loss. There's been no change in energy level, sleep pattern, or activity level. · Eyes: No visual changes or diplopia. No scleral icterus. · ENT: No Headaches, hearing loss or vertigo. No mouth sores or sore throat. · Cardiovascular: Reviewed in HPI  · Respiratory: No cough or wheezing, no sputum production. No hematemesis. · Gastrointestinal: No abdominal pain, appetite loss, blood in stools. No change in bowel or bladder habits. · Genitourinary: No dysuria, trouble voiding, or hematuria. · Musculoskeletal:  No gait disturbance, weakness or joint complaints. · Integumentary: No rash or pruritis. · Neurological: No headache, diplopia, change in muscle strength, numbness or tingling. No change in gait, balance, coordination, mood, affect, memory, mentation, behavior. · Psychiatric: No anxiety, no depression. · Endocrine: No malaise, fatigue or temperature intolerance. No excessive thirst, fluid intake, or urination. No tremor. · Hematologic/Lymphatic: No abnormal bruising or bleeding, blood clots or swollen lymph nodes. · Allergic/Immunologic: No nasal congestion or hives.     Physical Examination:    Vitals:    08/11/20 1540   BP: 118/80   Pulse: 70   Temp: 97.1 °F (36.2 °C)   SpO2: 98%      Wt Readings from Last 3 Encounters:   08/11/20 256 lb (116.1 kg)   01/27/20 275 lb 12.8 oz (125.1 kg)   07/01/19 288 lb (130.6 kg)         Constitutional and General Appearance: NAD   Respiratory:  · Normal excursion and expansion without use of accessory muscles  · Resp Auscultation: Normal breath sounds without dullness  Cardiovascular:  · The apical impulses not displaced  · Heart tones are crisp and normal  · Cervical veins are not engorged  · The carotid upstroke is normal in amplitude and contour without delay or bruit  · Normal S1S2, No S3, no murmur  · Peripheral pulses are symmetrical and full  · There is no clubbing, cyanosis of the extremities. · Trace BLE edema    · Femoral Arteries: 2+ and equal  · Pedal Pulses: 2+ and equal   Abdomen:  · No masses or tenderness  · Liver/Spleen: No Abnormalities Noted  Neurological/Psychiatric:  · Alert and oriented in all spheres  · Moves all extremities well  · Exhibits normal gait balance and coordination  · No abnormalities of mood, affect, memory, mentation, or behavior are noted        Skin: warm and dry    Lab Results   Component Value Date    CHOL 144 05/22/2020    CHOL 138 02/03/2020    CHOL 173 04/02/2019     Lab Results   Component Value Date    TRIG 256 (H) 05/22/2020    TRIG 276 (H) 02/03/2020    TRIG 251 04/02/2019     Lab Results   Component Value Date    HDL 35 (L) 05/22/2020    HDL 32 (L) 02/03/2020    HDL 39 04/02/2019     Lab Results   Component Value Date    LDLCALC 58 05/22/2020    LDLCALC 51 02/03/2020    LDLCALC 84 04/02/2019     Lab Results   Component Value Date    LABVLDL 51 05/22/2020    LABVLDL 55 02/03/2020    LABVLDL 44 12/03/2018    VLDL 50 04/02/2019       Assessment:     1. Coronary artery disease involving native heart without angina pectoris, unspecified vessel or lesion type:  Most recent Lexiscan 12/6/17 no evidence of stress induced ischemia. Large sized anterior,  anteroapical, apical, septal, and inferior fixed defects consistent with  infarction in the territory of the proximal to distal LAD and/or RCA. Only  the lateral wall appears to have decent perfusion; LVEF 24%.  There are no concerning symptoms for angina currently. 2. Chronic systolic CHF (congestive heart failure) ([address]): Clinically compensated NYHA Class I and will continue current CHF medical regimen. Most recent limited ECHO  2/6/18 EF 25-30%. Underwent BiV-ICD placed 4/18/18 with Dr. Kin Frias. Most recent device check 7/29/20 Transmission shows normal sensing and pacing function. Total * 97.0% (MVP Off) Effective 96.9%. No  arrhythmias recorded. Optivol @ baseline (battery life in 10/19 = 7.6 years). 3. Ischemic cardiomyopathy: See #1 and 2 above     4. Essential hypertension Stable and well controlled and will continue present medical regimen: Well controlled and will continue current medical regimen. 5.      Lipids: I personally reviewed most recent from 5/22/20 (see above). Good overall except for elevated TG. I strongly encouraged better diet and taking meds as prescribed. Plan:  1. Meds reviewed. Refills as warranted x 1  2. No need for cardiac testing at this time. 3. No changes today. Continue current medications. 4. Advised to take 1 water pill in morning and take 2nd dose around 3-4 pm to avoid urination while sleping. I am OK with him holding AM water pill occasionally when going out for daytime appts. Told him to take lasix after getting home. 5. Follow up with me in 6 months   6. Advised to keep feet elevated while sitting, limit extra salt limit sodium rich foods, limit fluids to 64 oz daily  7. Advised to make appt for new PCP through [facility] internist if NP is retiring. 8.  I am OK with him taking meds for ED advised he cannot take NItro within 24 hrs of taking Viagra or Cialis    Cost of prescription medications and patient compliance have been reviewed with patient. All questions answered.      Thank you for allowing me to participate in the care of this individual.     This note was scribed in the presence of Mihir Babcock MD by Markell Quintana RN    I, Dr. Migue Gonzalez, personally performed the services described in this documentation, as scribed by the above signed scribe in my presence. It is both accurate and complete to my knowledge. I agree with the details independently gathered by the clinical support staff, while the remaining scribed note accurately describes my personal service to the patient    Yoel Birch.  Andres Riddle M.D., [facility]

## 2020-11-05 ENCOUNTER — TELEPHONE (OUTPATIENT)
Dept: CARDIOLOGY CLINIC | Age: 65
End: 2020-11-05

## 2020-11-05 NOTE — TELEPHONE ENCOUNTER
Shailesh Morelos MD   11/5/2020  8:09 AM       Yes. Usual prescription in the VT zone- ATP X 8, 10 beats at 81%, NID 30/40, 25J, 35J      [place], [place]   11/4/2020  7:16 AM       Would you like any therapies programmed? Shailesh Morelos MD   11/3/2020  1:09 PM       Slow VT, but he should have a VT zone. Lets put VF at 280 msec and VT at 380 msec NID 30/40. Called patient and LVM for him to call the office back. Patient needs to be scheduled in the device clinic for programming changes per JMB.

## 2020-11-16 ENCOUNTER — NURSE ONLY (OUTPATIENT)
Dept: CARDIOLOGY CLINIC | Age: 65
End: 2020-11-16
Payer: COMMERCIAL

## 2020-11-16 PROCEDURE — 93284 PRGRMG EVAL IMPLANTABLE DFB: CPT | Performed by: INTERNAL MEDICINE

## 2020-11-16 NOTE — PROGRESS NOTES
Patient presents to the device clinic today for a programming evaluation for his defibrillator. Patient has a history of ICM. Takes Coreg. Last device interrogation was on 10/28. Since then, no new arrhythmias recorded. AP 20.1%  96.6% Effective 96.5%  All sensing and pacing parameters are within normal range. VT zone created per Dr. Demarco Tejeda request.     Detection Rates     Therapies  AT/AF Monitor >171 bpm    All Rx Off  VF On >214 bpm             ATP During Charging, 35J x 6  FVT OFF      All Rx Off  VT On 158-214 bpm    Burst(8), 25J, 35J x 4      Patient education was provided about device functionality, in home monitoring, and any other patient questions and/or concerns were addressed. Patient voices understanding. Please see interrogation for more detail. Patient will follow up in 3 months in office or remotely. See P[employer]art report under the Cardiology tab.

## 2021-01-14 RX ORDER — FUROSEMIDE 40 MG/1
TABLET ORAL
Qty: 180 TABLET | Refills: 2 | Status: SHIPPED | OUTPATIENT
Start: 2021-01-14 | End: 2021-11-22

## 2021-01-25 NOTE — PROGRESS NOTES
[facility]   Cardiac Followup    Referring Provider:  NIXON Dutton CNP     Chief Complaint   Patient presents with    6 Month Follow-Up     No new cardiac symptoms or concerns to report at this time     Subjective:  Mr Claudette Dancer is being seen today for cardiology follow up of CAD, CMP, CHF, HTN; no complaints today        Past Medical History:  Shaq Card a 72 y.o. patient who I originally saw as inpatient consult 12/4/17. He has PMH of CAD s/p CABG 2009, severe ischemic CM EF=25-30%, s/p BiV-ICD 4/18, remote MI, CRI (follows Dr. Amaris Chu), HLD, and HTN. Prior saw Dr. Romie Boucher but no cardiac care for years prior. Prior IRVIN in May 2007 normal.  Note ECHO 12/4/17 EF=25-30% with multiple WMA's; moderately dilated LV; grade III DD with elevated filling pressure; mild MR/AI; RVE with moderately reduced systolic function. Diagnosed with acute on chronic systolic CHF due to ischemic CM and med noncompliance. Most recent [facility] 12/6/17 no evidence of stress induced ischemia. Large sized anterior,  anteroapical, apical, septal, and inferior fixed defects c/w infarction. Only the lateral wall appears to have decent perfusion; LVEF of 24%. LV cavity mildly dilated. Most recent limited ECHO 2/6/18 EF 25-30%. Due to no improvement in EF with CHF med mgt he underwent BiV-ICD placed 4/18/18 with Dr. Mike Hamilton. Note EKG 10/19/18 NSR; IVCD; consider LHV; Lateral infarct -age undetermined; ST-abnormality consider inferior and lateral ischemia. Renal US 4/8/19 unremarkable. Ivette Albrecht CNP stopped Avapro (Irbesartan) and started Micardis (Raya Mereta). Most recent device check 11/16/20 normal function. No new arrhythmias recorded. AP 20.1%  96.6% Effective 96.5%. battery life 6yr 2 mo. Today he reports doing well. Denies chest pain, shortness of breath, edema, dizziness, palpitations and syncope. He is taking all his medications as prescribed without complication or side effects.      Past Medical History:   has a past medical history of Acute MI ([facility] [address] CAD (coronary artery disease), CHF (congestive heart failure) ([facility] [address] Diabetes ([facility] [address] and Hypertension. Surgical History:   has a past surgical history that includes Cardiac surgery. Social History:   reports that he has never smoked. He has never used smokeless tobacco. He reports previous alcohol use. He reports that he does not use drugs. Family History:  family history with father having hx CABG (? Age) and brother s/p CABG age 58     Home Medications:  Prior to Admission medications    Medication Sig Start Date End Date Taking?  Authorizing Provider   furosemide (LASIX) 40 MG tablet TAKE TWO TABLETS BY MOUTH DAILY 1/14/21  Yes Jacquelyn Ford MD   vitamin D (CHOLECALCIFEROL) 25 MCG (1000 UT) TABS tablet Take 1,000 Units by mouth daily   Yes Historical Provider, MD   spironolactone (ALDACTONE) 25 MG tablet Take 0.5 tablets by mouth daily 8/11/20  Yes Jacquelyn Ford MD   telmisartan (MICARDIS) 80 MG tablet TAKE ONE TABLET BY MOUTH DAILY 6/30/20  Yes Jacquelyn Ford MD   amLODIPine (NORVASC) 5 MG tablet Take 1 tablet by mouth daily 4/29/20  Yes Jacquelyn Ford MD   empagliflozin (JARDIANCE) 10 MG tablet Take 10 mg by mouth daily   Yes Historical Provider, MD   rosuvastatin (CRESTOR) 40 MG tablet Take 1 tablet by mouth daily 1/27/20  Yes Jacquelyn Ford MD   carvedilol (COREG) 12.5 MG tablet 1 tab in the morning and 2 tabs in the evening with food 1/27/20  Yes Jacquelyn Ford MD   insulin glargine [facility]) 100 UNIT/ML injection pen Inject 10 Units into the skin nightly Hold BS </= 90   Yes Historical Provider, MD   aspirin 81 MG chewable tablet Take 1 tablet by mouth daily 4/21/18  Yes NIXON Rebolledo - CNP   metFORMIN (GLUCOPHAGE) 500 MG tablet Take 1 tablet by mouth 2 times daily (with meals)  Patient taking differently: Take 1,000 mg by mouth 2 times daily (with meals) 2 tab BID 4/17/18  Yes Vonnie Torres MD glyBURIDE (DIABETA) 5 MG tablet Take 1 tablet by mouth daily (with breakfast) 4/19/18   Celsa Keenan MD      Allergies:  Patient has no known allergies. Review of Systems:   · Constitutional: there has been no unanticipated weight loss. There's been no change in energy level, sleep pattern, or activity level. · Eyes: No visual changes or diplopia. No scleral icterus. · ENT: No Headaches, hearing loss or vertigo. No mouth sores or sore throat. · Cardiovascular: Reviewed in HPI  · Respiratory: No cough or wheezing, no sputum production. No hematemesis. · Gastrointestinal: No abdominal pain, appetite loss, blood in stools. No change in bowel or bladder habits. · Genitourinary: No dysuria, trouble voiding, or hematuria. · Musculoskeletal:  No gait disturbance, weakness or joint complaints. · Integumentary: No rash or pruritis. · Neurological: No headache, diplopia, change in muscle strength, numbness or tingling. No change in gait, balance, coordination, mood, affect, memory, mentation, behavior. · Psychiatric: No anxiety, no depression. · Endocrine: No malaise, fatigue or temperature intolerance. No excessive thirst, fluid intake, or urination. No tremor. · Hematologic/Lymphatic: No abnormal bruising or bleeding, blood clots or swollen lymph nodes. · Allergic/Immunologic: No nasal congestion or hives.     Physical Examination:    Vitals:    01/26/21 1042   BP: 110/80   Pulse: 82   SpO2: 96%      Wt Readings from Last 3 Encounters:   01/26/21 268 lb (121.6 kg)   08/11/20 256 lb (116.1 kg)   01/27/20 275 lb 12.8 oz (125.1 kg)         Constitutional and General Appearance: NAD   Respiratory:  · Normal excursion and expansion without use of accessory muscles  · Resp Auscultation: Normal breath sounds without dullness  Cardiovascular:  · The apical impulses not displaced  · Heart tones are crisp and normal  · Cervical veins are not engorged  · The carotid upstroke is normal in amplitude and contour without delay or bruit  · Normal S1S2, No S3, no murmur  · Peripheral pulses are symmetrical and full  · There is no clubbing, cyanosis of the extremities. · no BLE edema    · Femoral Arteries: 2+ and equal  · Pedal Pulses: 2+ and equal   Abdomen:  · No masses or tenderness  · Liver/Spleen: No Abnormalities Noted  Neurological/Psychiatric:  · Alert and oriented in all spheres  · Moves all extremities well  · Exhibits normal gait balance and coordination  · No abnormalities of mood, affect, memory, mentation, or behavior are noted        Skin: warm and dry    Lab Results   Component Value Date    CHOL 144 05/22/2020    CHOL 138 02/03/2020    CHOL 173 04/02/2019     Lab Results   Component Value Date    TRIG 256 (H) 05/22/2020    TRIG 276 (H) 02/03/2020    TRIG 251 04/02/2019     Lab Results   Component Value Date    HDL 35 (L) 05/22/2020    HDL 32 (L) 02/03/2020    HDL 39 04/02/2019     Lab Results   Component Value Date    LDLCALC 58 05/22/2020    LDLCALC 51 02/03/2020    LDLCALC 84 04/02/2019     Lab Results   Component Value Date    LABVLDL 51 05/22/2020    LABVLDL 55 02/03/2020    LABVLDL 44 12/03/2018    VLDL 50 04/02/2019       Assessment:     1. Coronary artery disease involving native heart without angina pectoris, unspecified vessel or lesion type:  Most recent Lexiscan 12/6/17 no evidence of stress induced ischemia. Large sized anterior,  anteroapical, apical, septal, and inferior fixed defects consistent with  infarction in the territory of the proximal to distal LAD and/or RCA. Only  the lateral wall appears to have decent perfusion; LVEF 24%. There are no concerning symptoms for angina currently. 2. Chronic systolic CHF (congestive heart failure) ([facility] [address]): Clinically compensated NYHA Class I and will continue current CHF medical regimen. Most recent limited ECHO  2/6/18 EF 25-30%. Underwent BiV-ICD placed 4/18/18 with Dr. Vianca Huertas. Most recent device check 11/16/20 normal function.  No new arrhythmias recorded. AP 20.1%  96.6% Effective 96.5%. battery life 6yr 2 mo. 3. Ischemic cardiomyopathy: See #1 and 2 above     4. Essential hypertension Stable and well controlled and will continue present medical regimen: Well controlled and will continue current medical regimen. 5.      Lipids: I personally reviewed most recent from 5/22/20 (see above). Good overall except for elevated TG. I strongly encouraged better diet and taking meds as prescribed. Need to recheck      Plan:  1. Meds reviewed. Refills as warranted   2. Start taking Coreg 25 mg 2 times daily. New Rx sent. Will maximize beta blocker dose. 3. Labs- CMP, CBC, fasting lipids, Pro-BNP  4. Follow up with me in 6 months    5. EKG today shows probable NSR 67bpm; IVCD; right axis; lateral infarct; can't r/o prior septal infarct; T wave change inferolateral leads consider ischemia (no major change from 10/18 study). This note was scribed in the presence of Yeison Styles MD by Lenin Mccabe RN    I, Dr. Kar Armas, personally performed the services described in this documentation, as scribed by the above signed scribe in my presence. It is both accurate and complete to my knowledge. I agree with the details independently gathered by the clinical support staff, while the remaining scribed note accurately describes my personal service to the patient. Cost of prescription medications and patient compliance have been reviewed with patient. All questions answered. Thank you for allowing me to participate in the care of this individual.     Gerry Shipley.  Horacio Quintero M.D., [facility]

## 2021-01-26 ENCOUNTER — OFFICE VISIT (OUTPATIENT)
Dept: CARDIOLOGY CLINIC | Age: 66
End: 2021-01-26
Payer: COMMERCIAL

## 2021-01-26 VITALS
DIASTOLIC BLOOD PRESSURE: 80 MMHG | WEIGHT: 268 LBS | HEIGHT: 69 IN | HEART RATE: 82 BPM | OXYGEN SATURATION: 96 % | SYSTOLIC BLOOD PRESSURE: 110 MMHG | BODY MASS INDEX: 39.69 KG/M2

## 2021-01-26 DIAGNOSIS — I25.5 ISCHEMIC CARDIOMYOPATHY: ICD-10-CM

## 2021-01-26 DIAGNOSIS — I25.10 CORONARY ARTERY DISEASE INVOLVING NATIVE CORONARY ARTERY OF NATIVE HEART WITHOUT ANGINA PECTORIS: Primary | ICD-10-CM

## 2021-01-26 DIAGNOSIS — I10 ESSENTIAL HYPERTENSION: ICD-10-CM

## 2021-01-26 PROCEDURE — 1036F TOBACCO NON-USER: CPT | Performed by: INTERNAL MEDICINE

## 2021-01-26 PROCEDURE — G8484 FLU IMMUNIZE NO ADMIN: HCPCS | Performed by: INTERNAL MEDICINE

## 2021-01-26 PROCEDURE — 99214 OFFICE O/P EST MOD 30 MIN: CPT | Performed by: INTERNAL MEDICINE

## 2021-01-26 PROCEDURE — 3017F COLORECTAL CA SCREEN DOC REV: CPT | Performed by: INTERNAL MEDICINE

## 2021-01-26 PROCEDURE — 1123F ACP DISCUSS/DSCN MKR DOCD: CPT | Performed by: INTERNAL MEDICINE

## 2021-01-26 PROCEDURE — 4040F PNEUMOC VAC/ADMIN/RCVD: CPT | Performed by: INTERNAL MEDICINE

## 2021-01-26 PROCEDURE — G8417 CALC BMI ABV UP PARAM F/U: HCPCS | Performed by: INTERNAL MEDICINE

## 2021-01-26 PROCEDURE — 93000 ELECTROCARDIOGRAM COMPLETE: CPT | Performed by: INTERNAL MEDICINE

## 2021-01-26 PROCEDURE — G8427 DOCREV CUR MEDS BY ELIG CLIN: HCPCS | Performed by: INTERNAL MEDICINE

## 2021-01-26 RX ORDER — ROSUVASTATIN CALCIUM 40 MG/1
40 TABLET, COATED ORAL DAILY
Qty: 90 TABLET | Refills: 3 | Status: SHIPPED | OUTPATIENT
Start: 2021-01-26 | End: 2022-02-14

## 2021-01-26 RX ORDER — TELMISARTAN 80 MG/1
TABLET ORAL
Qty: 90 TABLET | Refills: 3 | Status: SHIPPED | OUTPATIENT
Start: 2021-01-26 | End: 2022-03-17

## 2021-01-26 RX ORDER — SPIRONOLACTONE 25 MG/1
12.5 TABLET ORAL DAILY
Qty: 45 TABLET | Refills: 3 | Status: SHIPPED | OUTPATIENT
Start: 2021-01-26 | End: 2021-07-27

## 2021-01-26 RX ORDER — AMLODIPINE BESYLATE 5 MG/1
5 TABLET ORAL DAILY
Qty: 90 TABLET | Refills: 3 | Status: SHIPPED | OUTPATIENT
Start: 2021-01-26 | End: 2022-07-18 | Stop reason: SDUPTHER

## 2021-01-26 RX ORDER — CARVEDILOL 12.5 MG/1
TABLET ORAL
Qty: 270 TABLET | Refills: 3 | Status: CANCELLED | OUTPATIENT
Start: 2021-01-26

## 2021-01-26 RX ORDER — CARVEDILOL 25 MG/1
25 TABLET ORAL 2 TIMES DAILY
Qty: 180 TABLET | Refills: 3 | Status: SHIPPED | OUTPATIENT
Start: 2021-01-26 | End: 2022-01-28

## 2021-01-26 NOTE — PATIENT INSTRUCTIONS
Plan:  1. Meds reviewed. Refills as warranted   2. Start taking Coreg 25 mg 2 times daily. New Rx sent   3. Labs- CMP, CBC, fasting lipids, Pro-BNP  4.  Follow up with me in 6 months

## 2021-01-26 NOTE — LETTER
[address]   Cardiac Followup    Referring Provider:  NIXON Barahona CNP     Chief Complaint   Patient presents with    6 Month Follow-Up     No new cardiac symptoms or concerns to report at this time     Subjective:  Mr Yady Plummer is being seen today for cardiology follow up of CAD, CMP, CHF, HTN; no complaints today        Past Medical History:  Kellen Chacon a 72 y.o. patient who I originally saw as inpatient consult 12/4/17. He has PMH of CAD s/p CABG 2009, severe ischemic CM EF=25-30%, s/p BiV-ICD 4/18, remote MI, CRI (follows Dr. Jennyfer Wilson), HLD, and HTN. Prior saw Dr. Daniel Montiel but no cardiac care for years prior. Prior IRVIN in May 2007 normal.  Note ECHO 12/4/17 EF=25-30% with multiple WMA's; moderately dilated LV; grade III DD with elevated filling pressure; mild MR/AI; RVE with moderately reduced systolic function. Diagnosed with acute on chronic systolic CHF due to ischemic CM and med noncompliance. Most recent [facility] 12/6/17 no evidence of stress induced ischemia. Large sized anterior,  anteroapical, apical, septal, and inferior fixed defects c/w infarction. Only the lateral wall appears to have decent perfusion; LVEF of 24%. LV cavity mildly dilated. Most recent limited ECHO 2/6/18 EF 25-30%. Due to no improvement in EF with CHF med mgt he underwent BiV-ICD placed 4/18/18 with Dr. Durga Reis. Note EKG 10/19/18 NSR; IVCD; consider LHV; Lateral infarct -age undetermined; ST-abnormality consider inferior and lateral ischemia. Renal US 4/8/19 unremarkable. Robyn Lorenzo CNP stopped Avapro (Irbesartan) and started Micardis (Paul Favia). Most recent device check 11/16/20 normal function. No new arrhythmias recorded. AP 20.1%  96.6% Effective 96.5%. battery life 6yr 2 mo. Today he reports doing well. Denies chest pain, shortness of breath, edema, dizziness, palpitations and syncope. He is taking all his medications as prescribed without complication or side effects. Past Medical History:   has a past medical history of Acute MI ([facility] [address] CAD (coronary artery disease), CHF (congestive heart failure) ([facility] [address] Diabetes ([facility] [address] and Hypertension. Surgical History:   has a past surgical history that includes Cardiac surgery. Social History:   reports that he has never smoked. He has never used smokeless tobacco. He reports previous alcohol use. He reports that he does not use drugs. Family History:  family history with father having hx CABG (? Age) and brother s/p CABG age 58     Home Medications:  Prior to Admission medications    Medication Sig Start Date End Date Taking?  Authorizing Provider   furosemide (LASIX) 40 MG tablet TAKE TWO TABLETS BY MOUTH DAILY 1/14/21  Yes Lavetta Canavan, MD   vitamin D (CHOLECALCIFEROL) 25 MCG (1000 UT) TABS tablet Take 1,000 Units by mouth daily   Yes Historical Provider, MD   spironolactone (ALDACTONE) 25 MG tablet Take 0.5 tablets by mouth daily 8/11/20  Yes Lavetta Canavan, MD   telmisartan (MICARDIS) 80 MG tablet TAKE ONE TABLET BY MOUTH DAILY 6/30/20  Yes Lavetta Canavan, MD   amLODIPine (NORVASC) 5 MG tablet Take 1 tablet by mouth daily 4/29/20  Yes Lavetta Canavan, MD   empagliflozin (JARDIANCE) 10 MG tablet Take 10 mg by mouth daily   Yes Historical Provider, MD   rosuvastatin (CRESTOR) 40 MG tablet Take 1 tablet by mouth daily 1/27/20  Yes Lavetta Canavan, MD   carvedilol (COREG) 12.5 MG tablet 1 tab in the morning and 2 tabs in the evening with food 1/27/20  Yes Lavetta Canavan, MD   insulin glargine [facility]) 100 UNIT/ML injection pen Inject 10 Units into the skin nightly Hold BS </= 90   Yes Historical Provider, MD   aspirin 81 MG chewable tablet Take 1 tablet by mouth daily 4/21/18  Yes NIXON Guerrero CNP   metFORMIN (GLUCOPHAGE) 500 MG tablet Take 1 tablet by mouth 2 times daily (with meals) Patient taking differently: Take 1,000 mg by mouth 2 times daily (with meals) 2 tab BID 4/17/18  Yes Ulises Hamilton MD   glyBURIDE (DIABETA) 5 MG tablet Take 1 tablet by mouth daily (with breakfast) 4/19/18   Ulises Hamilton MD      Allergies:  Patient has no known allergies. Review of Systems:   · Constitutional: there has been no unanticipated weight loss. There's been no change in energy level, sleep pattern, or activity level. · Eyes: No visual changes or diplopia. No scleral icterus. · ENT: No Headaches, hearing loss or vertigo. No mouth sores or sore throat. · Cardiovascular: Reviewed in HPI  · Respiratory: No cough or wheezing, no sputum production. No hematemesis. · Gastrointestinal: No abdominal pain, appetite loss, blood in stools. No change in bowel or bladder habits. · Genitourinary: No dysuria, trouble voiding, or hematuria. · Musculoskeletal:  No gait disturbance, weakness or joint complaints. · Integumentary: No rash or pruritis. · Neurological: No headache, diplopia, change in muscle strength, numbness or tingling. No change in gait, balance, coordination, mood, affect, memory, mentation, behavior. · Psychiatric: No anxiety, no depression. · Endocrine: No malaise, fatigue or temperature intolerance. No excessive thirst, fluid intake, or urination. No tremor. · Hematologic/Lymphatic: No abnormal bruising or bleeding, blood clots or swollen lymph nodes. · Allergic/Immunologic: No nasal congestion or hives.     Physical Examination:    Vitals:    01/26/21 1042   BP: 110/80   Pulse: 82   SpO2: 96%      Wt Readings from Last 3 Encounters:   01/26/21 268 lb (121.6 kg)   08/11/20 256 lb (116.1 kg)   01/27/20 275 lb 12.8 oz (125.1 kg)         Constitutional and General Appearance: NAD   Respiratory:  · Normal excursion and expansion without use of accessory muscles  · Resp Auscultation: Normal breath sounds without dullness  Cardiovascular:  · The apical impulses not displaced · Heart tones are crisp and normal  · Cervical veins are not engorged  · The carotid upstroke is normal in amplitude and contour without delay or bruit  · Normal S1S2, No S3, no murmur  · Peripheral pulses are symmetrical and full  · There is no clubbing, cyanosis of the extremities. · no BLE edema    · Femoral Arteries: 2+ and equal  · Pedal Pulses: 2+ and equal   Abdomen:  · No masses or tenderness  · Liver/Spleen: No Abnormalities Noted  Neurological/Psychiatric:  · Alert and oriented in all spheres  · Moves all extremities well  · Exhibits normal gait balance and coordination  · No abnormalities of mood, affect, memory, mentation, or behavior are noted        Skin: warm and dry    Lab Results   Component Value Date    CHOL 144 05/22/2020    CHOL 138 02/03/2020    CHOL 173 04/02/2019     Lab Results   Component Value Date    TRIG 256 (H) 05/22/2020    TRIG 276 (H) 02/03/2020    TRIG 251 04/02/2019     Lab Results   Component Value Date    HDL 35 (L) 05/22/2020    HDL 32 (L) 02/03/2020    HDL 39 04/02/2019     Lab Results   Component Value Date    LDLCALC 58 05/22/2020    LDLCALC 51 02/03/2020    LDLCALC 84 04/02/2019     Lab Results   Component Value Date    LABVLDL 51 05/22/2020    LABVLDL 55 02/03/2020    LABVLDL 44 12/03/2018    VLDL 50 04/02/2019       Assessment:     1. Coronary artery disease involving native heart without angina pectoris, unspecified vessel or lesion type:  Most recent Lexiscan 12/6/17 no evidence of stress induced ischemia. Large sized anterior,  anteroapical, apical, septal, and inferior fixed defects consistent with  infarction in the territory of the proximal to distal LAD and/or RCA. Only  the lateral wall appears to have decent perfusion; LVEF 24%. There are no concerning symptoms for angina currently. 2. Chronic systolic CHF (congestive heart failure) ([facility] [address]): Clinically compensated NYHA Class I and will continue current CHF medical regimen. Most recent limited ECHO  2/6/18 EF 25-30%. Underwent BiV-ICD placed 4/18/18 with Dr. Lilliam Combs. Most recent device check 11/16/20 normal function. No new arrhythmias recorded. AP 20.1%  96.6% Effective 96.5%. battery life 6yr 2 mo. 3. Ischemic cardiomyopathy: See #1 and 2 above     4. Essential hypertension Stable and well controlled and will continue present medical regimen: Well controlled and will continue current medical regimen. 5.      Lipids: I personally reviewed most recent from 5/22/20 (see above). Good overall except for elevated TG. I strongly encouraged better diet and taking meds as prescribed. Need to recheck      Plan:  1. Meds reviewed. Refills as warranted   2. Start taking Coreg 25 mg 2 times daily. New Rx sent. Will maximize beta blocker dose. 3. Labs- CMP, CBC, fasting lipids, Pro-BNP  4. Follow up with me in 6 months    5. EKG today shows probable NSR 67bpm; IVCD; right axis; lateral infarct; can't r/o prior septal infarct; T wave change inferolateral leads consider ischemia (no major change from 10/18 study). This note was scribed in the presence of Bailey Smith MD by Anel Mar RN    I, Dr. Devika Solis, personally performed the services described in this documentation, as scribed by the above signed scribe in my presence. It is both accurate and complete to my knowledge. I agree with the details independently gathered by the clinical support staff, while the remaining scribed note accurately describes my personal service to the patient. Cost of prescription medications and patient compliance have been reviewed with patient. All questions answered. Thank you for allowing me to participate in the care of this individual.     Quirino Thomas.  Casey Mckeon M.D., [facility]

## 2021-02-01 ENCOUNTER — HOSPITAL ENCOUNTER (OUTPATIENT)
Age: 66
Discharge: HOME | End: 2021-02-01
Payer: COMMERCIAL

## 2021-02-01 DIAGNOSIS — I25.5 ISCHEMIC CARDIOMYOPATHY: ICD-10-CM

## 2021-02-01 DIAGNOSIS — I25.10 CORONARY ARTERY DISEASE INVOLVING NATIVE CORONARY ARTERY OF NATIVE HEART WITHOUT ANGINA PECTORIS: ICD-10-CM

## 2021-02-01 DIAGNOSIS — I10 ESSENTIAL HYPERTENSION: ICD-10-CM

## 2021-02-01 LAB
A/G RATIO: 1.3 (ref 1.1–2.2)
ALBUMIN SERPL-MCNC: 4.3 G/DL (ref 3.4–5)
ALP BLD-CCNC: 92 U/L (ref 40–129)
ALT SERPL-CCNC: 27 U/L (ref 10–40)
ANION GAP SERPL CALCULATED.3IONS-SCNC: 8 MMOL/L (ref 3–16)
AST SERPL-CCNC: 30 U/L (ref 15–37)
BILIRUB SERPL-MCNC: 0.5 MG/DL (ref 0–1)
BUN BLDV-MCNC: 22 MG/DL (ref 7–20)
CALCIUM SERPL-MCNC: 9.1 MG/DL (ref 8.3–10.6)
CHLORIDE BLD-SCNC: 102 MMOL/L (ref 99–110)
CHOLESTEROL, FASTING: 140 MG/DL (ref 0–199)
CO2: 32 MMOL/L (ref 21–32)
CREAT SERPL-MCNC: 1.6 MG/DL (ref 0.8–1.3)
GFR AFRICAN AMERICAN: 53
GFR NON-AFRICAN AMERICAN: 44
GLOBULIN: 3.3 G/DL
GLUCOSE BLD-MCNC: 126 MG/DL (ref 70–99)
HCT VFR BLD CALC: 36 % (ref 40.5–52.5)
HDLC SERPL-MCNC: 33 MG/DL (ref 40–60)
HEMOGLOBIN: 11.9 G/DL (ref 13.5–17.5)
LDL CHOLESTEROL CALCULATED: 58 MG/DL
MCH RBC QN AUTO: 34 PG (ref 26–34)
MCHC RBC AUTO-ENTMCNC: 33.2 G/DL (ref 31–36)
MCV RBC AUTO: 102.3 FL (ref 80–100)
PDW BLD-RTO: 13.8 % (ref 12.4–15.4)
PLATELET # BLD: 160 K/UL (ref 135–450)
PMV BLD AUTO: 7.4 FL (ref 5–10.5)
POTASSIUM SERPL-SCNC: 4.4 MMOL/L (ref 3.5–5.1)
PRO-BNP: 317 PG/ML (ref 0–124)
RBC # BLD: 3.51 M/UL (ref 4.2–5.9)
SODIUM BLD-SCNC: 142 MMOL/L (ref 136–145)
TOTAL PROTEIN: 7.6 G/DL (ref 6.4–8.2)
TRIGLYCERIDE, FASTING: 247 MG/DL (ref 0–150)
VLDLC SERPL CALC-MCNC: 49 MG/DL
WBC # BLD: 6.2 K/UL (ref 4–11)

## 2021-02-01 PROCEDURE — 85027 COMPLETE CBC AUTOMATED: CPT

## 2021-02-01 PROCEDURE — 36415 COLL VENOUS BLD VENIPUNCTURE: CPT

## 2021-02-01 PROCEDURE — 80061 LIPID PANEL: CPT

## 2021-02-01 PROCEDURE — 83880 ASSAY OF NATRIURETIC PEPTIDE: CPT

## 2021-02-01 PROCEDURE — 80053 COMPREHEN METABOLIC PANEL: CPT

## 2021-02-18 ENCOUNTER — NURSE ONLY (OUTPATIENT)
Dept: CARDIOLOGY CLINIC | Age: 66
End: 2021-02-18
Payer: COMMERCIAL

## 2021-02-18 DIAGNOSIS — Z95.810 BIVENTRICULAR ICD (IMPLANTABLE CARDIOVERTER-DEFIBRILLATOR) IN PLACE: ICD-10-CM

## 2021-02-18 DIAGNOSIS — I25.5 ISCHEMIC CARDIOMYOPATHY: ICD-10-CM

## 2021-02-18 DIAGNOSIS — I50.23 ACUTE ON CHRONIC SYSTOLIC CHF (CONGESTIVE HEART FAILURE) (HCC): ICD-10-CM

## 2021-02-18 PROCEDURE — 93295 DEV INTERROG REMOTE 1/2/MLT: CPT | Performed by: INTERNAL MEDICINE

## 2021-02-18 PROCEDURE — 93296 REM INTERROG EVL PM/IDS: CPT | Performed by: INTERNAL MEDICINE

## 2021-02-18 PROCEDURE — 93297 REM INTERROG DEV EVAL ICPMS: CPT | Performed by: INTERNAL MEDICINE

## 2021-02-18 NOTE — PROGRESS NOTES
We received remote transmission from patient's monitor at home. Transmission shows normal sensing and pacing function. EP physician will review. See interrogation under cardiology tab in the [address] [place] [address] [address] field for more details. Pacing (% of Time Since 16-Nov-2020)  Total * 97.5% (MVP Off)  Effective 97.5%. Possible OptiVol fluid accumulation: 30-Jan-2021 -- 08-Feb-2021. Thoracic impedance trend stable. No  arrhythmias recorded. .Follow up in 3 months via [employer].

## 2021-02-18 NOTE — LETTER
[address] [facility] [phone]  [address]  [address] [address] [phone]    Pacemaker/Defibrillator Clinic          02/18/21        [address] [facility] [address]        Dear Rae Herrera    This letter is to inform you that we received the transmission from your monitor at home that checks your implanted heart device. The next date your monitor will automatically transmit will be 5/24. If your report needs attention we will notify you. Your device and monitor are wireless and most transmit cellularly, but please periodically check your monitor is still plugged in to the electrical outlet. If you still use the telephone land line to send please ensure the connection to the phone [email or web address] is secure. This will help to ensure successful automatic transmissions in the future. Also, the monitor needs to be close to you while sleeping at night. Please be aware that the remote device transmission sites are periodically monitored only during regular business hours during which simultaneous in-office device clinics are being run. If your transmission requires attention, we will contact you as soon as possible. Thank you.             Constanza Ramos

## 2021-03-23 ENCOUNTER — IMMUNIZATION (OUTPATIENT)
Dept: PRIMARY CARE CLINIC | Age: 66
End: 2021-03-23
Payer: COMMERCIAL

## 2021-03-23 PROCEDURE — 91300 COVID-19, PFIZER VACCINE 30MCG/0.3ML DOSE: CPT | Performed by: FAMILY MEDICINE

## 2021-03-23 PROCEDURE — 0001A COVID-19, PFIZER VACCINE 30MCG/0.3ML DOSE: CPT | Performed by: FAMILY MEDICINE

## 2021-04-08 ENCOUNTER — HOSPITAL ENCOUNTER (OUTPATIENT)
Age: 66
Discharge: HOME | End: 2021-04-08
Payer: COMMERCIAL

## 2021-04-08 LAB
HCT VFR BLD CALC: 36.3 % (ref 40.5–52.5)
HEMOGLOBIN: 12.1 G/DL (ref 13.5–17.5)
MCH RBC QN AUTO: 34.2 PG (ref 26–34)
MCHC RBC AUTO-ENTMCNC: 33.3 G/DL (ref 31–36)
MCV RBC AUTO: 103 FL (ref 80–100)
PDW BLD-RTO: 14.8 % (ref 12.4–15.4)
PLATELET # BLD: 180 K/UL (ref 135–450)
PMV BLD AUTO: 7.7 FL (ref 5–10.5)
RBC # BLD: 3.52 M/UL (ref 4.2–5.9)
WBC # BLD: 6.3 K/UL (ref 4–11)

## 2021-04-08 PROCEDURE — 85027 COMPLETE CBC AUTOMATED: CPT

## 2021-04-08 PROCEDURE — 36415 COLL VENOUS BLD VENIPUNCTURE: CPT

## 2021-04-13 ENCOUNTER — IMMUNIZATION (OUTPATIENT)
Dept: PRIMARY CARE CLINIC | Age: 66
End: 2021-04-13
Payer: COMMERCIAL

## 2021-04-13 PROCEDURE — 91300 COVID-19, PFIZER VACCINE 30MCG/0.3ML DOSE: CPT | Performed by: FAMILY MEDICINE

## 2021-04-13 PROCEDURE — 0002A COVID-19, PFIZER VACCINE 30MCG/0.3ML DOSE: CPT | Performed by: FAMILY MEDICINE

## 2021-05-05 ENCOUNTER — HOSPITAL ENCOUNTER (OUTPATIENT)
Age: 66
Discharge: HOME | End: 2021-05-05
Payer: COMMERCIAL

## 2021-05-05 LAB
ALBUMIN SERPL-MCNC: 4.5 G/DL (ref 3.4–5)
ANION GAP SERPL CALCULATED.3IONS-SCNC: 14 MMOL/L (ref 3–16)
BILIRUBIN URINE: NEGATIVE
BLOOD, URINE: ABNORMAL
BUN BLDV-MCNC: 35 MG/DL (ref 7–20)
CALCIUM SERPL-MCNC: 9.7 MG/DL (ref 8.3–10.6)
CHLORIDE BLD-SCNC: 101 MMOL/L (ref 99–110)
CLARITY: CLEAR
CO2: 26 MMOL/L (ref 21–32)
COLOR: ABNORMAL
CREAT SERPL-MCNC: 1.9 MG/DL (ref 0.8–1.3)
GFR AFRICAN AMERICAN: 43
GFR NON-AFRICAN AMERICAN: 36
GLUCOSE BLD-MCNC: 245 MG/DL (ref 70–99)
GLUCOSE URINE: >=1000 MG/DL
KETONES, URINE: NEGATIVE MG/DL
LEUKOCYTE ESTERASE, URINE: NEGATIVE
MICROSCOPIC EXAMINATION: YES
NITRITE, URINE: NEGATIVE
PH UA: 6 (ref 5–8)
PHOSPHORUS: 3.3 MG/DL (ref 2.5–4.9)
POTASSIUM SERPL-SCNC: 4.3 MMOL/L (ref 3.5–5.1)
PROTEIN UA: NEGATIVE MG/DL
SODIUM BLD-SCNC: 141 MMOL/L (ref 136–145)
SPECIFIC GRAVITY UA: 1.01 (ref 1–1.03)
URINE TYPE: ABNORMAL
UROBILINOGEN, URINE: 0.2 E.U./DL

## 2021-05-05 PROCEDURE — 82306 VITAMIN D 25 HYDROXY: CPT

## 2021-05-05 PROCEDURE — 36415 COLL VENOUS BLD VENIPUNCTURE: CPT

## 2021-05-05 PROCEDURE — 82570 ASSAY OF URINE CREATININE: CPT

## 2021-05-05 PROCEDURE — 84156 ASSAY OF PROTEIN URINE: CPT

## 2021-05-05 PROCEDURE — 83970 ASSAY OF PARATHORMONE: CPT

## 2021-05-05 PROCEDURE — 80069 RENAL FUNCTION PANEL: CPT

## 2021-05-05 PROCEDURE — 81001 URINALYSIS AUTO W/SCOPE: CPT

## 2021-05-06 LAB
CREATININE URINE: 20.8 MG/DL (ref 39–259)
EPITHELIAL CELLS, UA: NORMAL /HPF (ref 0–5)
PARATHYROID HORMONE INTACT: 91.2 PG/ML (ref 14–72)
PROTEIN PROTEIN: 11 MG/DL
PROTEIN/CREAT RATIO: 0.5 MG/DL
RBC UA: NORMAL /HPF (ref 0–4)
VITAMIN D 25-HYDROXY: 41.6 NG/ML
WBC UA: NORMAL /HPF (ref 0–5)

## 2021-05-24 ENCOUNTER — NURSE ONLY (OUTPATIENT)
Dept: CARDIOLOGY CLINIC | Age: 66
End: 2021-05-24
Payer: COMMERCIAL

## 2021-05-24 DIAGNOSIS — I25.5 ISCHEMIC CARDIOMYOPATHY: ICD-10-CM

## 2021-05-24 DIAGNOSIS — I44.7 LBBB (LEFT BUNDLE BRANCH BLOCK): ICD-10-CM

## 2021-05-24 DIAGNOSIS — I50.23 ACUTE ON CHRONIC SYSTOLIC CHF (CONGESTIVE HEART FAILURE) (HCC): ICD-10-CM

## 2021-05-24 DIAGNOSIS — Z95.810 BIVENTRICULAR ICD (IMPLANTABLE CARDIOVERTER-DEFIBRILLATOR) IN PLACE: ICD-10-CM

## 2021-05-24 PROCEDURE — 93295 DEV INTERROG REMOTE 1/2/MLT: CPT | Performed by: INTERNAL MEDICINE

## 2021-05-24 PROCEDURE — 93296 REM INTERROG EVL PM/IDS: CPT | Performed by: INTERNAL MEDICINE

## 2021-05-24 PROCEDURE — 93297 REM INTERROG DEV EVAL ICPMS: CPT | Performed by: INTERNAL MEDICINE

## 2021-05-24 NOTE — LETTER
utilizing the no news is good news approach. As always, please feel free to contact your nurse with any questions or concerns. Thank you.     [address]

## 2021-05-25 NOTE — PROGRESS NOTES
We received remote transmission from patient's monitor at home. Transmission shows normal sensing and pacing function. EP physician will review. See interrogation under cardiology tab in the [address] [place] [address] [address] field for more details. Pacing (% of Time Since 18-Feb-2021)  Total * 97.5% (MVP Off)  Effective 97.5%  Cardiac compass shows stable/consistent trends. Thoracic impedance trend stable. 1 nsvt x 8 beats (coreg)  Follow up in 3 months via carelink.

## 2021-07-23 PROBLEM — E78.49 OTHER HYPERLIPIDEMIA: Status: ACTIVE | Noted: 2021-07-23

## 2021-07-23 NOTE — PROGRESS NOTES
[facility]   Cardiac Followup    Referring Provider:  Nallely Rodriguez DO     Chief Complaint   Patient presents with    6 Month Follow-Up     Subjective:  Mr Anna Julio is being seen today for cardiology follow up of CAD, CMP, CHF, HTN; no complaints today        Past Medical History:  Lukasz Parada a 72 y.o. patient who I originally saw as inpatient consult 12/4/17. He has PMH of CAD s/p CABG 2009, severe ischemic CM EF=25-30%, s/p BiV-ICD 4/18, remote MI, CRI (follows Dr. Ajay Bee), HLD, and HTN. Prior IRVIN in May 2007 normal.  Note ECHO 12/4/17 EF=25-30% with multiple WMA's; moderately dilated LV; grade III DD with elevated filling pressure; mild MR/AI; RVE with reduced systolic function. Diagnosed with acute on chronic systolic CHF due to ischemic CM and med noncompliance. [facility] no [facility]; Large sized anterior,  anteroapical, apical, septal, and inferior fixed defects c/w infarction. Only the lateral wall appears to have decent perfusion; LVEF of 24%. Most recent limited ECHO 2/6/18 EF 25-30%. Due to no improvement in EF with CHF med mgt he underwent BiV-ICD placed 4/18/18 with Dr. Dina Luna. Note renal US 4/8/19 unremarkable. Janet MoralesPrince LOWERY stopped Avapro (Irbesartan) and started Micardis (Barbara Ogles). Most recent EKG 1/26/21 shows probable NSR 67bpm; IVCD; right axis; lateral infarct; septal infarct; T wave change inferolateral leads consider ischemia (no [id number]). Most recent device check 5/24/21 Transmission shows normal sensing and pacing function. Total * 97.5%Cardiac compass shows stable/consistent trends. Thoracic impedance trend stable. 1 nsvt x 8 beats. Estimated battery life 5 years. Today he denies chest pain, shortness of breath, edema, dizziness, palpitations and syncope. He was taken off of spironolactone by PCP in June 2021 due to low BP (<989 systolic), lightheadedness, weakness. He feels better after stopping medication.         Past Medical Take 1 tablet by mouth daily (with breakfast)  Patient not taking: Reported on 7/27/2021 4/19/18   Shantel Núñez MD      Allergies:  Patient has no known allergies. Review of Systems:   · Constitutional: there has been no unanticipated weight loss. There's been no change in energy level, sleep pattern, or activity level. · Eyes: No visual changes or diplopia. No scleral icterus. · ENT: No Headaches, hearing loss or vertigo. No mouth sores or sore throat. · Cardiovascular: Reviewed in HPI  · Respiratory: No cough or wheezing, no sputum production. No hematemesis. · Gastrointestinal: No abdominal pain, appetite loss, blood in stools. No change in bowel or bladder habits. · Genitourinary: No dysuria, trouble voiding, or hematuria. · Musculoskeletal:  No gait disturbance, weakness or joint complaints. · Integumentary: No rash or pruritis. · Neurological: No headache, diplopia, change in muscle strength, numbness or tingling. No change in gait, balance, coordination, mood, affect, memory, mentation, behavior. · Psychiatric: No anxiety, no depression. · Endocrine: No malaise, fatigue or temperature intolerance. No excessive thirst, fluid intake, or urination. No tremor. · Hematologic/Lymphatic: No abnormal bruising or bleeding, blood clots or swollen lymph nodes. · Allergic/Immunologic: No nasal congestion or hives.     Physical Examination:    Vitals:    07/27/21 1301   BP: 124/72   Pulse: 64   SpO2: 96%      Wt Readings from Last 3 Encounters:   07/27/21 263 lb (119.3 kg)   01/26/21 268 lb (121.6 kg)   08/11/20 256 lb (116.1 kg)         Constitutional and General Appearance: NAD   Respiratory:  · Normal excursion and expansion without use of accessory muscles  · Resp Auscultation: Normal breath sounds without dullness  Cardiovascular:  · The apical impulses not displaced  · Heart tones are crisp and normal  · Cervical veins are not engorged  · The carotid upstroke is normal in amplitude and contour without delay or bruit  · Normal S1S2, No S3, no murmur  · Peripheral pulses are symmetrical and full  · There is no clubbing, cyanosis of the extremities. · Trace BLE edema    · Femoral Arteries: 2+ and equal  · Pedal Pulses: 2+ and equal   Abdomen:  · No masses or tenderness  · Liver/Spleen: No Abnormalities Noted  Neurological/Psychiatric:  · Alert and oriented in all spheres  · Moves all extremities well  · Exhibits normal gait balance and coordination  · No abnormalities of mood, affect, memory, mentation, or behavior are noted        Skin: warm and dry    Lab Results   Component Value Date    CHOL 144 05/22/2020    CHOL 138 02/03/2020    CHOL 173 04/02/2019     Lab Results   Component Value Date    TRIG 256 (H) 05/22/2020    TRIG 276 (H) 02/03/2020    TRIG 251 04/02/2019     Lab Results   Component Value Date    HDL 33 (L) 02/01/2021    HDL 35 (L) 05/22/2020    HDL 32 (L) 02/03/2020     Lab Results   Component Value Date    LDLCALC 58 02/01/2021    LDLCALC 58 05/22/2020    LDLCALC 51 02/03/2020     Lab Results   Component Value Date    LABVLDL 49 02/01/2021    LABVLDL 51 05/22/2020    LABVLDL 55 02/03/2020    VLDL 50 04/02/2019       Assessment:     1. Coronary artery disease involving native heart without angina pectoris, unspecified vessel or lesion type:  Most recent Lexiscan 12/6/17 no evidence of stress induced ischemia. Large sized anterior,  anteroapical, apical, septal, and inferior fixed defects consistent with  infarction in the territory of the proximal to distal LAD and/or RCA. Only  the lateral wall appears to have decent perfusion; LVEF 24%. There are no concerning symptoms for angina currently. 2. Chronic systolic CHF (congestive heart failure) ([facility] [address]): Clinically compensated NYHA Class I and will continue current CHF medical regimen. Most recent limited ECHO  2/6/18 EF 25-30%. Underwent BiV-ICD placed 4/18/18 with Dr. Aleksandra Ohara.  Most recent device check 5/24/21 Transmission shows normal sensing and pacing function; short run NSVT. 3. Ischemic cardiomyopathy: See #1 and 2 above     4. Essential hypertension: Stable and well controlled and will continue present medical regimen: Well controlled and will continue current medical regimen. 5.      Lipids: I personally reviewed most recent from 2/1/21  (see above). Good overall except for elevated TG. I strongly encouraged better diet and taking meds as prescribed. Need to recheck    Plan:  1. Meds reviewed. No refills as warranted   2. No changes today. Continue current medications   3. Follow up with me in 6 months. If need to be seen sooner call the office    This note was scribed in the presence of Ruy Tapia MD by Josh Ponce RN      I, Dr. Alen Nelson, personally performed the services described in this documentation, as scribed by the above signed scribe in my presence. It is both accurate and complete to my knowledge. I agree with the details independently gathered by the clinical support staff, while the remaining scribed note accurately describes my personal service to the patient. Cost of prescription medications and patient compliance have been reviewed with patient. All questions answered. Thank you for allowing me to participate in the care of this individual.     Lori Tucker.  Marco Antonio Julio M.D., [facility]

## 2021-07-27 ENCOUNTER — OFFICE VISIT (OUTPATIENT)
Dept: CARDIOLOGY CLINIC | Age: 66
End: 2021-07-27
Payer: COMMERCIAL

## 2021-07-27 VITALS
HEIGHT: 69 IN | HEART RATE: 64 BPM | BODY MASS INDEX: 38.95 KG/M2 | DIASTOLIC BLOOD PRESSURE: 72 MMHG | SYSTOLIC BLOOD PRESSURE: 124 MMHG | WEIGHT: 263 LBS | OXYGEN SATURATION: 96 %

## 2021-07-27 DIAGNOSIS — I25.10 CORONARY ARTERY DISEASE INVOLVING NATIVE CORONARY ARTERY OF NATIVE HEART WITHOUT ANGINA PECTORIS: Primary | ICD-10-CM

## 2021-07-27 DIAGNOSIS — I10 ESSENTIAL HYPERTENSION: ICD-10-CM

## 2021-07-27 DIAGNOSIS — E78.49 OTHER HYPERLIPIDEMIA: ICD-10-CM

## 2021-07-27 PROCEDURE — 1123F ACP DISCUSS/DSCN MKR DOCD: CPT | Performed by: INTERNAL MEDICINE

## 2021-07-27 PROCEDURE — 4040F PNEUMOC VAC/ADMIN/RCVD: CPT | Performed by: INTERNAL MEDICINE

## 2021-07-27 PROCEDURE — 3017F COLORECTAL CA SCREEN DOC REV: CPT | Performed by: INTERNAL MEDICINE

## 2021-07-27 PROCEDURE — G8427 DOCREV CUR MEDS BY ELIG CLIN: HCPCS | Performed by: INTERNAL MEDICINE

## 2021-07-27 PROCEDURE — 99214 OFFICE O/P EST MOD 30 MIN: CPT | Performed by: INTERNAL MEDICINE

## 2021-07-27 PROCEDURE — 1036F TOBACCO NON-USER: CPT | Performed by: INTERNAL MEDICINE

## 2021-07-27 PROCEDURE — G8417 CALC BMI ABV UP PARAM F/U: HCPCS | Performed by: INTERNAL MEDICINE

## 2021-07-27 RX ORDER — GLIPIZIDE 5 MG/1
TABLET ORAL
COMMUNITY

## 2021-07-27 NOTE — PATIENT INSTRUCTIONS
Plan:  1. Meds reviewed. No refills as warranted   2. No changes today. Continue current medications   3. Follow up with me in 6 months.  If need to be seen sooner call the office

## 2021-07-27 NOTE — LETTER
[facility]   Cardiac Followup    Referring Provider:  Abelardo Perez DO     Chief Complaint   Patient presents with    6 Month Follow-Up     Subjective:  Mr Valencia Aviles is being seen today for cardiology follow up of CAD, CMP, CHF, HTN; no complaints today        Past Medical History:  Zenobia Coronado a 72 y.o. patient who I originally saw as inpatient consult 12/4/17. He has PMH of CAD s/p CABG 2009, severe ischemic CM EF=25-30%, s/p BiV-ICD 4/18, remote MI, CRI (follows Dr. Javed Loepz), HLD, and HTN. Prior IRVIN in May 2007 normal.  Note ECHO 12/4/17 EF=25-30% with multiple WMA's; moderately dilated LV; grade III DD with elevated filling pressure; mild MR/AI; RVE with reduced systolic function. Diagnosed with acute on chronic systolic CHF due to ischemic CM and med noncompliance. Most recent Kreg Spire 12/6/17 no ischemia; Large sized anterior,  anteroapical, apical, septal, and inferior fixed defects c/w infarction. Only the lateral wall appears to have decent perfusion; LVEF of 24%. Most recent limited ECHO 2/6/18 EF 25-30%. Due to no improvement in EF with CHF med mgt he underwent BiV-ICD placed 4/18/18 with Dr. King Dhillon. Note renal US 4/8/19 unremarkable. Ana Maria Candelaria CNP stopped Avapro (Irbesartan) and started Micardis (Velma Staple). Most recent EKG 1/26/21 shows probable NSR 67bpm; IVCD; right axis; lateral infarct; septal infarct; T wave change inferolateral leads consider ischemia (no [id number]/18). Most recent device check 5/24/21 Transmission shows normal sensing and pacing function. Total * 97.5%Cardiac compass shows stable/consistent trends. Thoracic impedance trend stable. 1 nsvt x 8 beats. Estimated battery life 5 years. Today he denies chest pain, shortness of breath, edema, dizziness, palpitations and syncope. He was taken off of spironolactone by PCP in June 2021 due to low BP (<623 systolic), lightheadedness, weakness. He feels better after stopping medication.         Past Medical History:   has a past medical history of Acute MI ([facility] [address] CAD (coronary artery disease), CHF (congestive heart failure) ([facility] [address] Diabetes ([facility] [address] and Hypertension. Surgical History:   has a past surgical history that includes Cardiac surgery. Social History:   reports that he has never smoked. He has never used smokeless tobacco. He reports previous alcohol use. He reports that he does not use drugs. Family History:  family history with father having hx CABG (? Age) and brother s/p CABG age 58     Home Medications:  Prior to Admission medications    Medication Sig Start Date End Date Taking?  Authorizing Provider   glipiZIDE (GLUCOTROL) 5 MG tablet Take by mouth daily (with breakfast)   Yes Historical Provider, MD   telmisartan (MICARDIS) 80 MG tablet TAKE ONE TABLET BY MOUTH DAILY 1/26/21  Yes Ruy Tapia MD   [facility] 5 MG tablet Take 1 tablet by mouth daily 1/26/21  Yes Ruy Tapia MD   rosuvastatin (CRESTOR) 40 MG tablet Take 1 tablet by mouth daily 1/26/21  Yes Ruy Tapia MD   carvedilol (COREG) 25 MG tablet Take 1 tablet by mouth 2 times daily 1/26/21  Yes Ruy Tapia MD   furosemide (LASIX) 40 MG tablet TAKE TWO TABLETS BY MOUTH DAILY 1/14/21  Yes Ruy Tapia MD   vitamin D (CHOLECALCIFEROL) 25 MCG (1000 UT) TABS tablet Take 1,000 Units by mouth daily   Yes Historical Provider, MD   empagliflozin (JARDIANCE) 10 MG tablet Take 10 mg by mouth daily   Yes Historical Provider, MD   insulin glargine (BASAGLAR KWIKPEN) 100 UNIT/ML injection pen Inject 10 Units into the skin nightly Hold BS </= 90   Yes Historical Provider, MD   aspirin 81 MG chewable tablet Take 1 tablet by mouth daily 4/21/18  Yes NIXON Barahona CNP   metFORMIN (GLUCOPHAGE) 500 MG tablet Take 1 tablet by mouth 2 times daily (with meals)  Patient taking differently: Take 1,000 mg by mouth 2 times daily (with meals) 2 tab BID 4/17/18  Yes Jennifer Paniagua MD   glyBURIDE (DIABETA) 5 MG tablet Take 1 tablet by mouth daily (with breakfast)  Patient not taking: Reported on 7/27/2021 4/19/18   Valerio Parson MD      Allergies:  Patient has no known allergies. Review of Systems:   · Constitutional: there has been no unanticipated weight loss. There's been no change in energy level, sleep pattern, or activity level. · Eyes: No visual changes or diplopia. No scleral icterus. · ENT: No Headaches, hearing loss or vertigo. No mouth sores or sore throat. · Cardiovascular: Reviewed in HPI  · Respiratory: No cough or wheezing, no sputum production. No hematemesis. · Gastrointestinal: No abdominal pain, appetite loss, blood in stools. No change in bowel or bladder habits. · Genitourinary: No dysuria, trouble voiding, or hematuria. · Musculoskeletal:  No gait disturbance, weakness or joint complaints. · Integumentary: No rash or pruritis. · Neurological: No headache, diplopia, change in muscle strength, numbness or tingling. No change in gait, balance, coordination, mood, affect, memory, mentation, behavior. · Psychiatric: No anxiety, no depression. · Endocrine: No malaise, fatigue or temperature intolerance. No excessive thirst, fluid intake, or urination. No tremor. · Hematologic/Lymphatic: No abnormal bruising or bleeding, blood clots or swollen lymph nodes. · Allergic/Immunologic: No nasal congestion or hives.     Physical Examination:    Vitals:    07/27/21 1301   BP: 124/72   Pulse: 64   SpO2: 96%      Wt Readings from Last 3 Encounters:   07/27/21 263 lb (119.3 kg)   01/26/21 268 lb (121.6 kg)   08/11/20 256 lb (116.1 kg)         Constitutional and General Appearance: NAD   Respiratory:  · Normal excursion and expansion without use of accessory muscles  · Resp Auscultation: Normal breath sounds without dullness  Cardiovascular:  · The apical impulses not displaced  · Heart tones are crisp and normal  · Cervical veins are not engorged  · The carotid upstroke is normal in amplitude and contour without delay or bruit  · Normal S1S2, No S3, no murmur  · Peripheral pulses are symmetrical and full  · There is no clubbing, cyanosis of the extremities. · Trace BLE edema    · Femoral Arteries: 2+ and equal  · Pedal Pulses: 2+ and equal   Abdomen:  · No masses or tenderness  · Liver/Spleen: No Abnormalities Noted  Neurological/Psychiatric:  · Alert and oriented in all spheres  · Moves all extremities well  · Exhibits normal gait balance and coordination  · No abnormalities of mood, affect, memory, mentation, or behavior are noted        Skin: warm and dry    Lab Results   Component Value Date    CHOL 144 05/22/2020    CHOL 138 02/03/2020    CHOL 173 04/02/2019     Lab Results   Component Value Date    TRIG 256 (H) 05/22/2020    TRIG 276 (H) 02/03/2020    TRIG 251 04/02/2019     Lab Results   Component Value Date    HDL 33 (L) 02/01/2021    HDL 35 (L) 05/22/2020    HDL 32 (L) 02/03/2020     Lab Results   Component Value Date    LDLCALC 58 02/01/2021    LDLCALC 58 05/22/2020    LDLCALC 51 02/03/2020     Lab Results   Component Value Date    LABVLDL 49 02/01/2021    LABVLDL 51 05/22/2020    LABVLDL 55 02/03/2020    VLDL 50 04/02/2019       Assessment:     1. Coronary artery disease involving native heart without angina pectoris, unspecified vessel or lesion type:  Most recent Lexiscan 12/6/17 no evidence of stress induced ischemia. Large sized anterior,  anteroapical, apical, septal, and inferior fixed defects consistent with  infarction in the territory of the proximal to distal LAD and/or RCA. Only  the lateral wall appears to have decent perfusion; LVEF 24%. There are no concerning symptoms for angina currently. 2. Chronic systolic CHF (congestive heart failure) ([facility] [address]): Clinically compensated NYHA Class I and will continue current CHF medical regimen. Most recent limited ECHO  2/6/18 EF 25-30%. Underwent BiV-ICD placed 4/18/18 with Dr. Radha Zarco.  Most recent device check 5/24/21 Transmission shows normal sensing and pacing function; short run NSVT. 3. Ischemic cardiomyopathy: See #1 and 2 above     4. Essential hypertension: Stable and well controlled and will continue present medical regimen: Well controlled and will continue current medical regimen. 5.      Lipids: I personally reviewed most recent from 2/1/21  (see above). Good overall except for elevated TG. I strongly encouraged better diet and taking meds as prescribed. Need to recheck    Plan:  1. Meds reviewed. No refills as warranted   2. No changes today. Continue current medications   3. Follow up with me in 6 months. If need to be seen sooner call the office    This note was scribed in the presence of Tonya Luo MD by Oksana Wesley RN      I, Dr. Yuri Arreola, personally performed the services described in this documentation, as scribed by the above signed scribe in my presence. It is both accurate and complete to my knowledge. I agree with the details independently gathered by the clinical support staff, while the remaining scribed note accurately describes my personal service to the patient. Cost of prescription medications and patient compliance have been reviewed with patient. All questions answered. Thank you for allowing me to participate in the care of this individual.     Harrison Moreno.  Gabriel Jiang M.D., [facility] - [place]

## 2021-08-23 ENCOUNTER — NURSE ONLY (OUTPATIENT)
Dept: CARDIOLOGY CLINIC | Age: 66
End: 2021-08-23
Payer: COMMERCIAL

## 2021-08-23 DIAGNOSIS — I25.5 ISCHEMIC CARDIOMYOPATHY: ICD-10-CM

## 2021-08-23 DIAGNOSIS — Z95.810 BIVENTRICULAR ICD (IMPLANTABLE CARDIOVERTER-DEFIBRILLATOR) IN PLACE: ICD-10-CM

## 2021-08-23 DIAGNOSIS — I50.9 NEW ONSET OF CONGESTIVE HEART FAILURE (HCC): ICD-10-CM

## 2021-08-23 DIAGNOSIS — I50.23 ACUTE ON CHRONIC SYSTOLIC CHF (CONGESTIVE HEART FAILURE) (HCC): ICD-10-CM

## 2021-08-25 PROCEDURE — 93295 DEV INTERROG REMOTE 1/2/MLT: CPT | Performed by: INTERNAL MEDICINE

## 2021-08-25 PROCEDURE — 93297 REM INTERROG DEV EVAL ICPMS: CPT | Performed by: INTERNAL MEDICINE

## 2021-08-25 PROCEDURE — 93296 REM INTERROG EVL PM/IDS: CPT | Performed by: INTERNAL MEDICINE

## 2021-08-25 NOTE — PROGRESS NOTES
Remote transmission received for patients CRT-D. Transmission shows normal sensing and pacing function. EP physician will review. See interrogation under the cardiology tab in the [address] [place] [address] [address] field for more details. Will continue to monitor remotely. Pacing (% of Time Since 24-May-2021)  Total * 97.8% (MVP Off)  Effective 97.7%. Thoracic impedance trend stable/at baseline. Possible OptiVol fluid accumulation: 14-Aug-2021 -- 20-Aug-2021. optivol @ 0. No  arrhythmias recorded.

## 2021-09-13 ENCOUNTER — HOSPITAL ENCOUNTER (OUTPATIENT)
Age: 66
Discharge: HOME | End: 2021-09-13
Payer: COMMERCIAL

## 2021-09-13 LAB
BACTERIA: ABNORMAL /HPF
BILIRUBIN URINE: NEGATIVE
BLOOD, URINE: ABNORMAL
CLARITY: CLEAR
COLOR: YELLOW
GLUCOSE URINE: >=1000 MG/DL
KETONES, URINE: NEGATIVE MG/DL
LEUKOCYTE ESTERASE, URINE: NEGATIVE
MICROSCOPIC EXAMINATION: YES
MUCUS: ABNORMAL /LPF
NITRITE, URINE: NEGATIVE
PH UA: 5 (ref 5–8)
PROTEIN UA: 30 MG/DL
RBC UA: ABNORMAL /HPF (ref 0–4)
SPECIFIC GRAVITY UA: 1.02 (ref 1–1.03)
URINE TYPE: ABNORMAL
UROBILINOGEN, URINE: 0.2 E.U./DL
WBC UA: ABNORMAL /HPF (ref 0–5)

## 2021-09-13 PROCEDURE — 81001 URINALYSIS AUTO W/SCOPE: CPT

## 2021-09-20 ENCOUNTER — HOSPITAL ENCOUNTER (OUTPATIENT)
Age: 66
Discharge: HOME | End: 2021-09-20
Payer: COMMERCIAL

## 2021-09-20 LAB
ALBUMIN SERPL-MCNC: 4.8 G/DL (ref 3.4–5)
ANION GAP SERPL CALCULATED.3IONS-SCNC: 13 MMOL/L (ref 3–16)
BUN BLDV-MCNC: 34 MG/DL (ref 7–20)
CALCIUM SERPL-MCNC: 9.5 MG/DL (ref 8.3–10.6)
CHLORIDE BLD-SCNC: 105 MMOL/L (ref 99–110)
CO2: 24 MMOL/L (ref 21–32)
CREAT SERPL-MCNC: 1.8 MG/DL (ref 0.8–1.3)
GFR AFRICAN AMERICAN: 46
GFR NON-AFRICAN AMERICAN: 38
GLUCOSE BLD-MCNC: 99 MG/DL (ref 70–99)
HCT VFR BLD CALC: 37.1 % (ref 40.5–52.5)
HEMOGLOBIN: 12.2 G/DL (ref 13.5–17.5)
MCH RBC QN AUTO: 33.1 PG (ref 26–34)
MCHC RBC AUTO-ENTMCNC: 32.8 G/DL (ref 31–36)
MCV RBC AUTO: 101.1 FL (ref 80–100)
PDW BLD-RTO: 14.3 % (ref 12.4–15.4)
PHOSPHORUS: 2.6 MG/DL (ref 2.5–4.9)
PLATELET # BLD: 148 K/UL (ref 135–450)
PMV BLD AUTO: 8.5 FL (ref 5–10.5)
POTASSIUM SERPL-SCNC: 4.2 MMOL/L (ref 3.5–5.1)
RBC # BLD: 3.67 M/UL (ref 4.2–5.9)
SODIUM BLD-SCNC: 142 MMOL/L (ref 136–145)
WBC # BLD: 6.4 K/UL (ref 4–11)

## 2021-09-20 PROCEDURE — 85027 COMPLETE CBC AUTOMATED: CPT

## 2021-09-20 PROCEDURE — 80069 RENAL FUNCTION PANEL: CPT

## 2021-09-20 PROCEDURE — 36415 COLL VENOUS BLD VENIPUNCTURE: CPT

## 2021-11-19 ENCOUNTER — IMMUNIZATION (OUTPATIENT)
Dept: PRIMARY CARE CLINIC | Age: 66
End: 2021-11-19
Payer: COMMERCIAL

## 2021-11-19 PROCEDURE — 0004A COVID-19, PFIZER VACCINE 30MCG/0.3ML DOSE: CPT | Performed by: FAMILY MEDICINE

## 2021-11-19 PROCEDURE — 91300 COVID-19, PFIZER VACCINE 30MCG/0.3ML DOSE: CPT | Performed by: FAMILY MEDICINE

## 2021-11-22 ENCOUNTER — NURSE ONLY (OUTPATIENT)
Dept: CARDIOLOGY CLINIC | Age: 66
End: 2021-11-22
Payer: COMMERCIAL

## 2021-11-22 DIAGNOSIS — I25.5 ISCHEMIC CARDIOMYOPATHY: ICD-10-CM

## 2021-11-22 DIAGNOSIS — Z95.810 BIVENTRICULAR ICD (IMPLANTABLE CARDIOVERTER-DEFIBRILLATOR) IN PLACE: ICD-10-CM

## 2021-11-22 DIAGNOSIS — I50.23 ACUTE ON CHRONIC SYSTOLIC CHF (CONGESTIVE HEART FAILURE) (HCC): ICD-10-CM

## 2021-11-22 RX ORDER — FUROSEMIDE 40 MG/1
TABLET ORAL
Qty: 180 TABLET | Refills: 2 | Status: SHIPPED | OUTPATIENT
Start: 2021-11-22 | End: 2022-07-18 | Stop reason: SDUPTHER

## 2021-11-22 NOTE — PROGRESS NOTES
Remote transmission received for patients CRT-D. Transmission shows normal sensing and pacing function. EP physician will review. See interrogation under the cardiology tab in the [address] [place] [address] [address] field for more details. Will continue to monitor remotely. Pacing (% of Time Since 23-Aug-2021)  Total * 96.9% (MVP Off)  Effective 96.9%   No  arrhythmias recorded. TI trending down-will call pt.

## 2021-11-23 ENCOUNTER — TELEPHONE (OUTPATIENT)
Dept: CARDIOLOGY CLINIC | Age: 66
End: 2021-11-23

## 2021-11-23 PROCEDURE — 93295 DEV INTERROG REMOTE 1/2/MLT: CPT | Performed by: INTERNAL MEDICINE

## 2021-11-23 PROCEDURE — 93296 REM INTERROG EVL PM/IDS: CPT | Performed by: INTERNAL MEDICINE

## 2021-11-23 PROCEDURE — 93297 REM INTERROG DEV EVAL ICPMS: CPT | Performed by: INTERNAL MEDICINE

## 2021-11-23 NOTE — TELEPHONE ENCOUNTER
Please call pt and assess for s/s of chf and forward to Dr Ambar Trent if he is symptomatic. TI trending down indicating possible fluid accumulation. See PACEART report under Cardiology tab.

## 2021-11-24 NOTE — TELEPHONE ENCOUNTER
LVM for pt to call [facility] office to ask assess him before sending to [facility]

## 2022-01-25 NOTE — PROGRESS NOTES
[facility]   Cardiac Followup    Referring Provider:  Yeison Ortega DO     Chief Complaint   Patient presents with    6 Month Follow-Up    Coronary Artery Disease    Other     No new concerns     Subjective:  Mr Bryce Tran is being seen today for cardiology follow up; No cardiac complaints today. Past Medical History:  Haley Ryan a 77 y.o. male who I originally saw as inpatient consult 12/4/17. He has PMH CAD s/p CABG 2009, severe ischemic CM EF=25-30%, s/p BiV-ICD 4/18, remote MI, CRI (follows Dr. Jennie Vaughn), HLD, and HTN. Prior IRVIN in May 2007 normal.  Note ECHO 12/4/17 EF=25-30% with multiple WMA's; moderately dilated LV; grade III DD with elevated filling pressure; mild MR/AI; RVE with reduced systolic function. Diagnosed with acute on chronic systolic CHF due to ischemic CM and med noncompliance. Most recent Evy Mandril 12/6/17 no ischemia; Large sized anterior,  anteroapical, apical, septal, and inferior fixed defects c/w infarction. Only the lateral wall appears to have decent perfusion; LVEF of 24%. Most recent limited ECHO 2/6/18 EF 25-30%. Due to no improvement in EF with CHF med mgt he underwent BiV-ICD placed 4/18/18 with Dr. Patel Napoles. Note renal US 4/8/19 unremarkable. Nancy Sanabria CNP stopped Avapro (Irbesartan) and started Micardis (Anny Georgi). Most recent EKG 1/26/21 shows probable NSR 67bpm; IVCD; right axis; lateral infarct; septal infarct; T wave change inferolateral leads consider ischemia (no [id number]/18). He was taken off of spironolactone by PCP in June 2021 due to symptomatic hypotension. Most recent Device check 11/22/21 transmission shows normal sensing and pacing function. Pacing (% of Time Since 23-Aug-2021) Total * 96.9% (MVP Off) Effective 96.9% No arrhythmias recorded. TI trending down-will call pt. Today, he reports he is doing well. He follows with Dr. Jennie Vaughn for his kidneys.  Patient denies current edema, chest pain, sob, palpitations, dizziness or syncope. Patient is taking all cardiac medications as prescribed and tolerates them well. Weight today is 261# (down 2# from 7/21)    Patient is vaccinated against Covid. Pfizer 3/3    Past Medical History:   has a past medical history of Acute MI ([facility] [address] CAD (coronary artery disease), CHF (congestive heart failure) ([facility] [address] Diabetes ([facility] [address] and Hypertension. Surgical History:   has a past surgical history that includes Cardiac surgery. Social History:   reports that he has never smoked. He has never used smokeless tobacco. He reports previous alcohol use. He reports that he does not use drugs. Family History:  family history with father having hx CABG (? Age) and brother s/p CABG age 58     Home Medications:  Prior to Admission medications    Medication Sig Start Date End Date Taking?  Authorizing Provider   carvedilol (COREG) 25 MG tablet TAKE ONE TABLET BY MOUTH TWICE A DAY 1/28/22  Yes Daysi Valera MD   furosemide (LASIX) 40 MG tablet TAKE TWO TABLETS BY MOUTH DAILY 11/22/21  Yes Neha Jones MD   glipiZIDE (GLUCOTROL) 5 MG tablet Take by mouth daily (with breakfast)   Yes Historical Provider, MD   telmisartan (MICARDIS) 80 MG tablet TAKE ONE TABLET BY MOUTH DAILY 1/26/21  Yes Neha Jones MD   amLODIPine (NORVASC) 5 MG tablet Take 1 tablet by mouth daily 1/26/21  Yes Neha Jones MD   rosuvastatin (CRESTOR) 40 MG tablet Take 1 tablet by mouth daily 1/26/21  Yes Neha Jones MD   vitamin D (CHOLECALCIFEROL) 25 MCG (1000 UT) TABS tablet Take 1,000 Units by mouth daily   Yes Historical Provider, MD   empagliflozin (JARDIANCE) 10 MG tablet Take 10 mg by mouth daily   Yes Historical Provider, MD   insulin glargine (BASAGLAR KWIKPEN) 100 UNIT/ML injection pen Inject 10 Units into the skin nightly Hold BS </= 90   Yes Historical Provider, MD   aspirin 81 MG chewable tablet Take 1 tablet by mouth daily 4/21/18  Yes NIXON Jiang CNP   glyBURIDE (DIABETA) 5 MG tablet Take 1 tablet by mouth daily (with breakfast) 4/19/18  Yes Billy Davis MD   metFORMIN (GLUCOPHAGE) 500 MG tablet Take 1 tablet by mouth 2 times daily (with meals)  Patient taking differently: Take 1,000 mg by mouth 2 times daily (with meals) 2 tab BID 4/17/18  Yes Billy Davis MD      Allergies:  Patient has no known allergies. Review of Systems:   · Constitutional: there has been no unanticipated weight loss. There's been no change in energy level, sleep pattern, or activity level. · Eyes: No visual changes or diplopia. No scleral icterus. · ENT: No Headaches, hearing loss or vertigo. No mouth sores or sore throat. · Cardiovascular: Reviewed in HPI  · Respiratory: No cough or wheezing, no sputum production. No hematemesis. · Gastrointestinal: No abdominal pain, appetite loss, blood in stools. No change in bowel or bladder habits. · Genitourinary: No dysuria, trouble voiding, or hematuria. · Musculoskeletal:  No gait disturbance, weakness or joint complaints. · Integumentary: No rash or pruritis. · Neurological: No headache, diplopia, change in muscle strength, numbness or tingling. No change in gait, balance, coordination, mood, affect, memory, mentation, behavior. · Psychiatric: No anxiety, no depression. · Endocrine: No malaise, fatigue or temperature intolerance. No excessive thirst, fluid intake, or urination. No tremor. · Hematologic/Lymphatic: No abnormal bruising or bleeding, blood clots or swollen lymph nodes. · Allergic/Immunologic: No nasal congestion or hives.     Physical Examination:    Vitals:    01/31/22 0934   BP: 130/80   Pulse: 78   Temp: 98.3 °F (36.8 °C)   SpO2: 98%      Wt Readings from Last 3 Encounters:   01/31/22 261 lb 6.4 oz (118.6 kg)   07/27/21 263 lb (119.3 kg)   01/26/21 268 lb (121.6 kg)         Constitutional and General Appearance: NAD   Respiratory:  · Normal excursion and expansion without use of accessory muscles  · Resp Auscultation: Clear, no crackles or wheezes  Cardiovascular:  · The apical impulses not displaced  · Heart tones are crisp and normal  · Cervical veins are not engorged  · The carotid upstroke is normal in amplitude and contour without delay or bruit  · Normal S1S2, No S3, no murmur  · Peripheral pulses are symmetrical and full  · There is no clubbing, cyanosis of the extremities. · Trace BLL edema    · Femoral Arteries: 2+ and equal  · Pedal Pulses: 2+ and equal   Abdomen:  · No masses or tenderness  · Liver/Spleen: No Abnormalities Noted  Neurological/Psychiatric:  · Alert and oriented in all spheres  · Moves all extremities well  · Exhibits normal gait balance and coordination  · No abnormalities of mood, affect, memory, mentation, or behavior are noted        Skin: warm and dry    Lab Results   Component Value Date    CHOL 144 05/22/2020    CHOL 138 02/03/2020    CHOL 173 04/02/2019     Lab Results   Component Value Date    TRIG 256 (H) 05/22/2020    TRIG 276 (H) 02/03/2020    TRIG 251 04/02/2019     Lab Results   Component Value Date    HDL 33 (L) 02/01/2021    HDL 35 (L) 05/22/2020    HDL 32 (L) 02/03/2020     Lab Results   Component Value Date    LDLCALC 58 02/01/2021    LDLCALC 58 05/22/2020    LDLCALC 51 02/03/2020     Lab Results   Component Value Date    LABVLDL 49 02/01/2021    LABVLDL 51 05/22/2020    LABVLDL 55 02/03/2020    VLDL 50 04/02/2019     I have personally reviewed all labs including lipids 2/1/21    Assessment:     1. Coronary artery disease involving native heart without angina pectoris, unspecified vessel or lesion type:  Most recent Lexiscan 12/6/17 no evidence of stress induced ischemia. Large sized anterior,  anteroapical, apical, septal, and inferior fixed defects consistent with  infarction in the territory of the proximal to distal LAD and/or RCA. Only  the lateral wall appears to have decent perfusion; LVEF 24%. There are no concerning symptoms for angina currently.        2. Chronic systolic CHF (congestive heart failure) [facility]): Clinically compensated NYHA Class I and will continue current CHF medical regimen. Most recent limited ECHO  2/6/18 EF 25-30%. Underwent BiV-ICD placed 4/18/18 with Dr. Leo Vargas. Most recent Device check 11/22/21 transmission shows normal sensing and pacing function. 3. Ischemic cardiomyopathy: See #1 and 2 above     4. Essential hypertension: Stable and well controlled and will continue present medical regimen: Well controlled and will continue current medical regimen. 5.      Lipids: I personally reviewed most recent from 2/1/21  (see above). Good overall except for elevated TG. I strongly encouraged better diet and taking meds as prescribed. Need to recheck    Plan:  1. Stop at the  and have your pcp changed in the computer  2. Repeat yearly lab work in Feb 2022  3. Current medications reviewed. No changes at this time. Refills given as warranted. 4. No cardiac testing at this time. 5. Have yearly labs checked   -Do not eat for 8 hours before testing   -ok to take pills with sips of water or black coffee. Follow up with me in 6 months    This note is scribed in the presence of Dr. Huong Fuentes. Ro Manjarrez by Isreal Odonnell RN.    I, Dr. Roland Ontiveros, personally performed the services described in this documentation, as scribed by the above signed scribe in my presence. It is both accurate and complete to my knowledge. I agree with the details independently gathered by the clinical support staff, while the remaining scribed note accurately describes my personal service to the patient. Cost of prescription medications and patient compliance have been reviewed with patient. All questions answered. Thank you for allowing me to participate in the care of this individual.     Huong Fuentes.  Ro Manjarrez M.D., [facility] - [place]

## 2022-01-28 RX ORDER — CARVEDILOL 25 MG/1
TABLET ORAL
Qty: 180 TABLET | Refills: 0 | Status: SHIPPED | OUTPATIENT
Start: 2022-01-28 | End: 2022-01-31 | Stop reason: SDUPTHER

## 2022-01-31 ENCOUNTER — OFFICE VISIT (OUTPATIENT)
Dept: CARDIOLOGY CLINIC | Age: 67
End: 2022-01-31
Payer: COMMERCIAL

## 2022-01-31 VITALS
BODY MASS INDEX: 38.72 KG/M2 | OXYGEN SATURATION: 98 % | DIASTOLIC BLOOD PRESSURE: 80 MMHG | HEIGHT: 69 IN | TEMPERATURE: 98.3 F | WEIGHT: 261.4 LBS | HEART RATE: 78 BPM | SYSTOLIC BLOOD PRESSURE: 130 MMHG

## 2022-01-31 DIAGNOSIS — I44.7 LBBB (LEFT BUNDLE BRANCH BLOCK): ICD-10-CM

## 2022-01-31 DIAGNOSIS — E78.49 OTHER HYPERLIPIDEMIA: ICD-10-CM

## 2022-01-31 DIAGNOSIS — I25.5 ISCHEMIC CARDIOMYOPATHY: ICD-10-CM

## 2022-01-31 DIAGNOSIS — I50.23 ACUTE ON CHRONIC SYSTOLIC CHF (CONGESTIVE HEART FAILURE) (HCC): Primary | ICD-10-CM

## 2022-01-31 DIAGNOSIS — I10 PRIMARY HYPERTENSION: ICD-10-CM

## 2022-01-31 DIAGNOSIS — Z79.899 MEDICATION MANAGEMENT: ICD-10-CM

## 2022-01-31 PROCEDURE — G8417 CALC BMI ABV UP PARAM F/U: HCPCS | Performed by: INTERNAL MEDICINE

## 2022-01-31 PROCEDURE — 1036F TOBACCO NON-USER: CPT | Performed by: INTERNAL MEDICINE

## 2022-01-31 PROCEDURE — G8427 DOCREV CUR MEDS BY ELIG CLIN: HCPCS | Performed by: INTERNAL MEDICINE

## 2022-01-31 PROCEDURE — 4040F PNEUMOC VAC/ADMIN/RCVD: CPT | Performed by: INTERNAL MEDICINE

## 2022-01-31 PROCEDURE — 99214 OFFICE O/P EST MOD 30 MIN: CPT | Performed by: INTERNAL MEDICINE

## 2022-01-31 PROCEDURE — 3017F COLORECTAL CA SCREEN DOC REV: CPT | Performed by: INTERNAL MEDICINE

## 2022-01-31 PROCEDURE — G8484 FLU IMMUNIZE NO ADMIN: HCPCS | Performed by: INTERNAL MEDICINE

## 2022-01-31 PROCEDURE — 1123F ACP DISCUSS/DSCN MKR DOCD: CPT | Performed by: INTERNAL MEDICINE

## 2022-01-31 RX ORDER — CARVEDILOL 25 MG/1
TABLET ORAL
Qty: 180 TABLET | Refills: 3 | Status: SHIPPED | OUTPATIENT
Start: 2022-01-31 | End: 2022-07-18 | Stop reason: SDUPTHER

## 2022-01-31 NOTE — PATIENT INSTRUCTIONS
Plan:  1. Stop at the  and have your pcp changed in the computer  2. Repeat yearly lab work in Feb 2022  3. Current medications reviewed. No changes at this time. Refills given as warranted. 4. No cardiac testing at this time.    5. Have yearly labs checked              -Do not eat for 8 hours before testing              -ok to take pills with sips of water or black coffee.     Follow up with me in 6 months

## 2022-02-14 RX ORDER — ROSUVASTATIN CALCIUM 40 MG/1
TABLET, COATED ORAL
Qty: 90 TABLET | Refills: 3 | Status: SHIPPED | OUTPATIENT
Start: 2022-02-14

## 2022-02-21 ENCOUNTER — HOSPITAL ENCOUNTER (OUTPATIENT)
Age: 67
Discharge: HOME | End: 2022-02-21
Payer: MEDICARE

## 2022-02-21 ENCOUNTER — NURSE ONLY (OUTPATIENT)
Dept: CARDIOLOGY CLINIC | Age: 67
End: 2022-02-21
Payer: MEDICARE

## 2022-02-21 DIAGNOSIS — Z95.810 BIVENTRICULAR ICD (IMPLANTABLE CARDIOVERTER-DEFIBRILLATOR) IN PLACE: ICD-10-CM

## 2022-02-21 DIAGNOSIS — Z79.899 MEDICATION MANAGEMENT: ICD-10-CM

## 2022-02-21 DIAGNOSIS — I25.5 ISCHEMIC CARDIOMYOPATHY: ICD-10-CM

## 2022-02-21 DIAGNOSIS — I50.9 NEW ONSET OF CONGESTIVE HEART FAILURE (HCC): ICD-10-CM

## 2022-02-21 DIAGNOSIS — I50.23 ACUTE ON CHRONIC SYSTOLIC CHF (CONGESTIVE HEART FAILURE) (HCC): ICD-10-CM

## 2022-02-21 LAB
A/G RATIO: 1.6 (ref 1.1–2.2)
ALBUMIN SERPL-MCNC: 4.5 G/DL (ref 3.4–5)
ALP BLD-CCNC: 110 U/L (ref 40–129)
ALT SERPL-CCNC: 41 U/L (ref 10–40)
ANION GAP SERPL CALCULATED.3IONS-SCNC: 15 MMOL/L (ref 3–16)
AST SERPL-CCNC: 41 U/L (ref 15–37)
BASOPHILS ABSOLUTE: 0.1 K/UL (ref 0–0.2)
BASOPHILS RELATIVE PERCENT: 1 %
BILIRUB SERPL-MCNC: 0.5 MG/DL (ref 0–1)
BUN BLDV-MCNC: 34 MG/DL (ref 7–20)
CALCIUM SERPL-MCNC: 9.6 MG/DL (ref 8.3–10.6)
CHLORIDE BLD-SCNC: 100 MMOL/L (ref 99–110)
CO2: 25 MMOL/L (ref 21–32)
CREAT SERPL-MCNC: 1.7 MG/DL (ref 0.8–1.3)
EOSINOPHILS ABSOLUTE: 0.1 K/UL (ref 0–0.6)
EOSINOPHILS RELATIVE PERCENT: 1.8 %
GFR AFRICAN AMERICAN: 49
GFR NON-AFRICAN AMERICAN: 40
GLUCOSE BLD-MCNC: 162 MG/DL (ref 70–99)
HCT VFR BLD CALC: 36.7 % (ref 40.5–52.5)
HEMOGLOBIN: 12.5 G/DL (ref 13.5–17.5)
LYMPHOCYTES ABSOLUTE: 1.4 K/UL (ref 1–5.1)
LYMPHOCYTES RELATIVE PERCENT: 19 %
MCH RBC QN AUTO: 33.5 PG (ref 26–34)
MCHC RBC AUTO-ENTMCNC: 34 G/DL (ref 31–36)
MCV RBC AUTO: 98.7 FL (ref 80–100)
MONOCYTES ABSOLUTE: 0.6 K/UL (ref 0–1.3)
MONOCYTES RELATIVE PERCENT: 8 %
NEUTROPHILS ABSOLUTE: 5.1 K/UL (ref 1.7–7.7)
NEUTROPHILS RELATIVE PERCENT: 70.2 %
PDW BLD-RTO: 14.4 % (ref 12.4–15.4)
PLATELET # BLD: 156 K/UL (ref 135–450)
PMV BLD AUTO: 7.6 FL (ref 5–10.5)
POTASSIUM SERPL-SCNC: 4.3 MMOL/L (ref 3.5–5.1)
RBC # BLD: 3.72 M/UL (ref 4.2–5.9)
SODIUM BLD-SCNC: 140 MMOL/L (ref 136–145)
TOTAL PROTEIN: 7.4 G/DL (ref 6.4–8.2)
WBC # BLD: 7.3 K/UL (ref 4–11)

## 2022-02-21 PROCEDURE — 36415 COLL VENOUS BLD VENIPUNCTURE: CPT

## 2022-02-21 PROCEDURE — 93295 DEV INTERROG REMOTE 1/2/MLT: CPT | Performed by: INTERNAL MEDICINE

## 2022-02-21 PROCEDURE — 80053 COMPREHEN METABOLIC PANEL: CPT

## 2022-02-21 PROCEDURE — 84443 ASSAY THYROID STIM HORMONE: CPT

## 2022-02-21 PROCEDURE — 93297 REM INTERROG DEV EVAL ICPMS: CPT | Performed by: INTERNAL MEDICINE

## 2022-02-21 PROCEDURE — 85025 COMPLETE CBC W/AUTO DIFF WBC: CPT

## 2022-02-21 PROCEDURE — 93296 REM INTERROG EVL PM/IDS: CPT | Performed by: INTERNAL MEDICINE

## 2022-02-21 PROCEDURE — 80061 LIPID PANEL: CPT

## 2022-02-22 ENCOUNTER — TELEPHONE (OUTPATIENT)
Dept: CARDIOLOGY CLINIC | Age: 67
End: 2022-02-22

## 2022-02-22 DIAGNOSIS — E78.49 OTHER HYPERLIPIDEMIA: Primary | ICD-10-CM

## 2022-02-22 LAB
CHOLESTEROL, TOTAL: 147 MG/DL (ref 0–199)
HDLC SERPL-MCNC: 33 MG/DL (ref 40–60)
LDL CHOLESTEROL CALCULATED: ABNORMAL MG/DL
LDL CHOLESTEROL DIRECT: 57 MG/DL
TRIGL SERPL-MCNC: 316 MG/DL (ref 0–150)
TSH REFLEX FT4: 2.25 UIU/ML (ref 0.27–4.2)
VLDLC SERPL CALC-MCNC: ABNORMAL MG/DL

## 2022-02-22 NOTE — PROGRESS NOTES
Remote transmission received for patients CRT-D. Transmission shows normal sensing and pacing function. EP physician will review. See interrogation under the cardiology tab in the [address] [place] [address] [address] field for more details. Will continue to monitor remotely. Pacing (% of Time Since 22-Nov-2021)  Total * 97.0% (MVP Off)  Effective 96.9%  Possible OptiVol fluid accumulation: 25-Nov-2021 -- 18-Dec-2021. Thoracic impedance trend stable. Episodes Since: 22-Nov-2021  1 Non-sustained VT (coreg).

## 2022-02-22 NOTE — TELEPHONE ENCOUNTER
----- Message from Demetrice Saba MD sent at 2/21/2022 12:52 PM EST -----  Stable kidney and liver function

## 2022-03-17 RX ORDER — TELMISARTAN 80 MG/1
TABLET ORAL
Qty: 90 TABLET | Refills: 3 | Status: SHIPPED | OUTPATIENT
Start: 2022-03-17

## 2022-04-06 RX ORDER — DULAGLUTIDE 3 MG/.5ML
3 INJECTION, SOLUTION SUBCUTANEOUS WEEKLY
COMMUNITY

## 2022-04-12 ENCOUNTER — ANESTHESIA EVENT (OUTPATIENT)
Dept: ENDOSCOPY | Age: 67
End: 2022-04-12
Payer: MEDICARE

## 2022-04-12 NOTE — ANESTHESIA PRE PROCEDURE
Department of Anesthesiology  Preprocedure Note       Name:  Omar Denis   Age:  77 y.o.  :  1955                                          MRN:  [id number]         Date:  2022      Surgeon: Kay Ozuna):  Bryon Linn MD    Procedure: Procedure(s):  COLON W/ANES. (12:00)    Medications prior to admission:   Prior to Admission medications    Medication Sig Start Date End Date Taking?  Authorizing Provider   Dulaglutide (TRULICITY) 3 QF/4.5JR SOPN Inject 3 mg into the skin once a week   Yes Historical Provider, MD   telmisartan (MICARDIS) 80 MG tablet TAKE ONE TABLET BY MOUTH DAILY 3/17/22   Shalini Mckeon MD   rosuvastatin (CRESTOR) 40 MG tablet TAKE ONE TABLET BY MOUTH DAILY 22   Shalini Mckeon MD   carvedilol (COREG) 25 MG tablet TAKE ONE TABLET BY MOUTH TWICE A DAY 22   Shalini Mckeon MD   furosemide (LASIX) 40 MG tablet TAKE TWO TABLETS BY MOUTH DAILY 21   Shalini Mckeon MD   glipiZIDE (GLUCOTROL) 5 MG tablet Take by mouth daily (with breakfast)    Historical Provider, MD   amLODIPine (NORVASC) 5 MG tablet Take 1 tablet by mouth daily 21   Shalini Mckeon MD   vitamin D (CHOLECALCIFEROL) 25 MCG (1000 UT) TABS tablet Take 1,000 Units by mouth daily    Historical Provider, MD   empagliflozin (JARDIANCE) 10 MG tablet Take 10 mg by mouth daily    Historical Provider, MD   insulin glargine (BASAGLAR KWIKPEN) 100 UNIT/ML injection pen Inject 40 Units into the skin nightly Hold BS </= 90     Historical Provider, MD   aspirin 81 MG chewable tablet Take 1 tablet by mouth daily 18   NIXON Ibanez - CNP   glyBURIDE (DIABETA) 5 MG tablet Take 1 tablet by mouth daily (with breakfast) 18   Pardeep Young MD   metFORMIN (GLUCOPHAGE) 500 MG tablet Take 1 tablet by mouth 2 times daily (with meals)  Patient taking differently: Take 1,000 mg by mouth 2 times daily (with meals) 2 tab BID 18   Pardeep Young MD       Current medications:    No current facility-administered medications for this encounter.      Current Outpatient Medications   Medication Sig Dispense Refill    Dulaglutide (TRULICITY) 3 IJ/0.0GK SOPN Inject 3 mg into the skin once a week      telmisartan (MICARDIS) 80 MG tablet TAKE ONE TABLET BY MOUTH DAILY 90 tablet 3    rosuvastatin (CRESTOR) 40 MG tablet TAKE ONE TABLET BY MOUTH DAILY 90 tablet 3    carvedilol (COREG) 25 MG tablet TAKE ONE TABLET BY MOUTH TWICE A  tablet 3    furosemide (LASIX) 40 MG tablet TAKE TWO TABLETS BY MOUTH DAILY 180 tablet 2    glipiZIDE (GLUCOTROL) 5 MG tablet Take by mouth daily (with breakfast)      amLODIPine (NORVASC) 5 MG tablet Take 1 tablet by mouth daily 90 tablet 3    vitamin D (CHOLECALCIFEROL) 25 MCG (1000 UT) TABS tablet Take 1,000 Units by mouth daily      empagliflozin (JARDIANCE) 10 MG tablet Take 10 mg by mouth daily      insulin glargine (BASAGLAR KWIKPEN) 100 UNIT/ML injection pen Inject 40 Units into the skin nightly Hold BS </= 90       aspirin 81 MG chewable tablet Take 1 tablet by mouth daily 30 tablet 3    glyBURIDE (DIABETA) 5 MG tablet Take 1 tablet by mouth daily (with breakfast) 30 tablet 1    metFORMIN (GLUCOPHAGE) 500 MG tablet Take 1 tablet by mouth 2 times daily (with meals) (Patient taking differently: Take 1,000 mg by mouth 2 times daily (with meals) 2 tab BID) 60 tablet 1       Allergies:  No Known Allergies    Problem List:    Patient Active Problem List   Diagnosis Code    HTN (hypertension) I10    Coronary artery disease involving native heart without angina pectoris I25.10    Obesity E66.9    Bilateral leg edema R60.0    New onset of congestive heart failure (HCC) I50.9    Acute on chronic systolic CHF (congestive heart failure) (HCC) I50.23    Ischemic cardiomyopathy I25.5    Diabetes mellitus (HCC) E11.9    Biventricular ICD (implantable cardioverter-defibrillator) in place Z95.810    LBBB (left bundle branch block) I44.7    Cardiomyopathy ([facility] I42.9    Other hyperlipidemia E78.49       Past Medical History:        Diagnosis Date    Acute MI ([place] [address].)     CAD (coronary artery disease)     CHF (congestive heart failure) (HCC)     Diabetes ([place] [address].)     Hyperlipidemia     Hypertension        Past Surgical History:        Procedure Laterality Date    CARDIAC SURGERY         Social History:    Social History     Tobacco Use    Smoking status: Never Smoker    Smokeless tobacco: Never Used   Substance Use Topics    Alcohol use: Not Currently                                Counseling given: Not Answered      Vital Signs (Current):   Vitals:    04/06/22 1330   Weight: 263 lb (119.3 kg)   Height: 5' 9\" (1.753 m)                                              BP Readings from Last 3 Encounters:   01/31/22 130/80   07/27/21 124/72   01/26/21 110/80       NPO Status:                                                                                 BMI:   Wt Readings from Last 3 Encounters:   01/31/22 261 lb 6.4 oz (118.6 kg)   07/27/21 263 lb (119.3 kg)   01/26/21 268 lb (121.6 kg)     Body mass index is 38.84 kg/m². CBC:   Lab Results   Component Value Date    WBC 7.3 02/21/2022    RBC 3.72 02/21/2022    HGB 12.5 02/21/2022    HCT 36.7 02/21/2022    MCV 98.7 02/21/2022    RDW 14.4 02/21/2022     02/21/2022       CMP:   Lab Results   Component Value Date     02/21/2022    K 4.3 02/21/2022    K 4.3 04/19/2018     02/21/2022    CO2 25 02/21/2022    BUN 34 02/21/2022    CREATININE 1.7 02/21/2022    GFRAA 49 02/21/2022    AGRATIO 1.6 02/21/2022    LABGLOM 40 02/21/2022    GLUCOSE 162 02/21/2022    PROT 7.4 02/21/2022    CALCIUM 9.6 02/21/2022    BILITOT 0.5 02/21/2022    ALKPHOS 110 02/21/2022    AST 41 02/21/2022    ALT 41 02/21/2022       POC Tests: No results for input(s): POCGLU, POCNA, POCK, POCCL, POCBUN, POCHEMO, POCHCT in the last 72 hours.     Coags:   Lab Results   Component Value Date    PROTIME 12.2 04/18/2018    INR 1.08 04/18/2018 HCG (If Applicable): No results found for: PREGTESTUR, PREGSERUM, HCG, HCGQUANT     ABGs: No results found for: PHART, PO2ART, UEF9DJI, MRH9LEH, BEART, S5LADETL     Type & Screen (If Applicable):  No results found for: LABABO, LABRH    Drug/Infectious Status (If Applicable):  No results found for: HIV, HEPCAB    COVID-19 Screening (If Applicable): No results found for: COVID19        Anesthesia Evaluation  Patient summary reviewed and Nursing notes reviewed no history of anesthetic complications:   Airway: Mallampati: II  TM distance: >3 FB   Neck ROM: full  Mouth opening: > = 3 FB Dental: normal exam         Pulmonary:Negative Pulmonary ROS                              Cardiovascular:    (+) hypertension:, pacemaker: AICD, past MI:, CAD:, CHF (cardiomyopathy): systolic,                   Neuro/Psych:   Negative Neuro/Psych ROS              GI/Hepatic/Renal: Neg GI/Hepatic/Renal ROS       (-) GERD, liver disease and no renal disease       Endo/Other:    (+) DiabetesType II DM, using insulin, . Abdominal:             Vascular: negative vascular ROS. Other Findings:           Anesthesia Plan      MAC     ASA 3       Induction: intravenous. Anesthetic plan and risks discussed with patient. Plan discussed with CRNA.                   Laurent Meyer MD   4/12/2022

## 2022-04-13 ENCOUNTER — HOSPITAL ENCOUNTER (OUTPATIENT)
Age: 67
Setting detail: OUTPATIENT SURGERY
Discharge: HOME | End: 2022-04-13
Attending: INTERNAL MEDICINE | Admitting: INTERNAL MEDICINE
Payer: MEDICARE

## 2022-04-13 ENCOUNTER — ANESTHESIA (OUTPATIENT)
Dept: ENDOSCOPY | Age: 67
End: 2022-04-13
Payer: MEDICARE

## 2022-04-13 VITALS
OXYGEN SATURATION: 97 % | DIASTOLIC BLOOD PRESSURE: 89 MMHG | SYSTOLIC BLOOD PRESSURE: 123 MMHG | RESPIRATION RATE: 16 BRPM

## 2022-04-13 VITALS
WEIGHT: 263 LBS | HEIGHT: 69 IN | DIASTOLIC BLOOD PRESSURE: 72 MMHG | BODY MASS INDEX: 38.95 KG/M2 | SYSTOLIC BLOOD PRESSURE: 112 MMHG | TEMPERATURE: 97.1 F | RESPIRATION RATE: 16 BRPM | HEART RATE: 84 BPM | OXYGEN SATURATION: 96 %

## 2022-04-13 LAB
GLUCOSE BLD-MCNC: 165 MG/DL (ref 70–99)
PERFORMED ON: ABNORMAL

## 2022-04-13 PROCEDURE — 3700000000 HC ANESTHESIA ATTENDED CARE: Performed by: INTERNAL MEDICINE

## 2022-04-13 PROCEDURE — 6360000002 HC RX W HCPCS: Performed by: NURSE ANESTHETIST, CERTIFIED REGISTERED

## 2022-04-13 PROCEDURE — 2500000003 HC RX 250 WO HCPCS: Performed by: NURSE ANESTHETIST, CERTIFIED REGISTERED

## 2022-04-13 PROCEDURE — 3609010600 HC COLONOSCOPY POLYPECTOMY SNARE/COLD BIOPSY: Performed by: INTERNAL MEDICINE

## 2022-04-13 PROCEDURE — 3700000001 HC ADD 15 MINUTES (ANESTHESIA): Performed by: INTERNAL MEDICINE

## 2022-04-13 PROCEDURE — 88305 TISSUE EXAM BY PATHOLOGIST: CPT

## 2022-04-13 PROCEDURE — 2709999900 HC NON-CHARGEABLE SUPPLY: Performed by: INTERNAL MEDICINE

## 2022-04-13 PROCEDURE — 7100000011 HC PHASE II RECOVERY - ADDTL 15 MIN: Performed by: INTERNAL MEDICINE

## 2022-04-13 PROCEDURE — 2580000003 HC RX 258: Performed by: NURSE ANESTHETIST, CERTIFIED REGISTERED

## 2022-04-13 PROCEDURE — 7100000010 HC PHASE II RECOVERY - FIRST 15 MIN: Performed by: INTERNAL MEDICINE

## 2022-04-13 RX ORDER — LIDOCAINE HYDROCHLORIDE 20 MG/ML
INJECTION, SOLUTION INFILTRATION; PERINEURAL PRN
Status: DISCONTINUED | OUTPATIENT
Start: 2022-04-13 | End: 2022-04-13 | Stop reason: SDUPTHER

## 2022-04-13 RX ORDER — SODIUM CHLORIDE, SODIUM LACTATE, POTASSIUM CHLORIDE, CALCIUM CHLORIDE 600; 310; 30; 20 MG/100ML; MG/100ML; MG/100ML; MG/100ML
INJECTION, SOLUTION INTRAVENOUS CONTINUOUS
Status: DISCONTINUED | OUTPATIENT
Start: 2022-04-13 | End: 2022-04-13 | Stop reason: HOSPADM

## 2022-04-13 RX ORDER — PROPOFOL 10 MG/ML
INJECTION, EMULSION INTRAVENOUS PRN
Status: DISCONTINUED | OUTPATIENT
Start: 2022-04-13 | End: 2022-04-13 | Stop reason: SDUPTHER

## 2022-04-13 RX ORDER — SODIUM CHLORIDE, SODIUM LACTATE, POTASSIUM CHLORIDE, CALCIUM CHLORIDE 600; 310; 30; 20 MG/100ML; MG/100ML; MG/100ML; MG/100ML
INJECTION, SOLUTION INTRAVENOUS CONTINUOUS PRN
Status: DISCONTINUED | OUTPATIENT
Start: 2022-04-13 | End: 2022-04-13 | Stop reason: SDUPTHER

## 2022-04-13 RX ADMIN — SODIUM CHLORIDE, POTASSIUM CHLORIDE, SODIUM LACTATE AND CALCIUM CHLORIDE: 600; 310; 30; 20 INJECTION, SOLUTION INTRAVENOUS at 11:27

## 2022-04-13 RX ADMIN — LIDOCAINE HYDROCHLORIDE 50 MG: 20 INJECTION, SOLUTION INFILTRATION; PERINEURAL at 11:27

## 2022-04-13 RX ADMIN — PROPOFOL 50 MG: 10 INJECTION, EMULSION INTRAVENOUS at 11:40

## 2022-04-13 RX ADMIN — PROPOFOL 200 MG: 10 INJECTION, EMULSION INTRAVENOUS at 11:27

## 2022-04-13 ASSESSMENT — PAIN - FUNCTIONAL ASSESSMENT: PAIN_FUNCTIONAL_ASSESSMENT: 0-10

## 2022-04-13 ASSESSMENT — PAIN SCALES - GENERAL: PAINLEVEL_OUTOF10: 0

## 2022-04-13 NOTE — PROGRESS NOTES
Went over discharge instructions with patient and family both verbalize and understand discharge instructions. IV removed and documented. Patient left surgery floor in stable condition to home with family and all belongings.

## 2022-04-13 NOTE — OP NOTE
[address]                 [address] [place] [address]                                OPERATIVE REPORT    PATIENT NAME: Ness Chowdhury                     :        1955  MED REC NO:   [id number]                          ROOM:  ACCOUNT NO:   [de-identified]                           ADMIT DATE: 2022  PROVIDER:     Clyde Alaniz MD    DATE OF PROCEDURE:  2022    PREPROCEDURE DIAGNOSIS:  Rectal bleeding. PROCEDURE:  Colonoscopy to the cecum with snare polypectomy. POSTPROCEDURE DIAGNOSES:  1. Poor prep. 2.  Colon polyps. 3.  Internal hemorrhoids. 4.  No active bleeding. PROCEDURE INDICATIONS: A 75-year-old male with history of hypertension,  hyperlipidemia, diabetes, coronary artery disease,, congestive heart  failure, presents for diagnostic colonoscopy. He has been recently  having change in bowel habits and rectal bleeding. MEDICATIONS:  MAC per Anesthesia. PROCEDURE IN DETAILS:  An informed consent obtained after discussing  risks, benefits, alternatives. Full history and physical was performed. The patient was classified as ASA class III. Medications were given to  achieve adequate sedation. The cardiopulmonary status was continuously  monitored throughout the procedure. The patient was placed in left  lateral decubitus position. Once adequately sedated, a standard  pediatric colonoscope was inserted in the anus and advanced to the cecum  identified by landmarks up until appendiceal orifice and IC valve. Entire mucosa of cecum, ascending colon, transverse colon, descending  colon, sigmoid colon, rectum were examined carefully during withdrawal.   The colon prep was poor particularly in the left colon with large amount  of thick stool and solid stool.   Aggressive lavage was performed, but  polyps could have been easily missed on this exam.  The patient  tolerated the procedure well without any difficulties. FINDINGS:  RECTUM:  The digital rectal exam was normal.  No masses were felt. COLON:  There were two small sessile 4 and 5 mm polyps in the ascending  colon. Both of these were completely resected and retrieved using snare  polypectomy method. There was another semi-pedunculated 7-mm polyp in  the transverse colon. This was completely resected and retrieved using  snare polypectomy method. Remaining examined colon mucosa appeared  normal and healthy without any evidence of inflammation, ulcers,  pseudomembranes, masses. However, the prep was poor precluding adequate  visualization particularly in the left colon. Retroflexed view of the  rectum showed small internal hemorrhoids. SUMMARY:  1. Three colon polyps. 2.  Internal hemorrhoids. 3.  Poor prep in the left colon. 4.  Otherwise normal.  No active bleeding. RECOMMENDATIONS:  1. Discharge the patient to home when standard parameters are met. 2.  Await pathology results. 3.  Repeat colonoscopy in 1 year with a 2-day prep. 4.  Encourage high-fiber diet. 5.  Repeat labs in 4 to 6 weeks including CBC and iron studies.     EBL: <5mL    Jamari Conn MD    D: 04/13/2022 11:49:45       T: 04/13/2022 11:52:06     [email or web address]  Job#: [id number]     Doc#: [id number]    CC:  Francisco Mota MD       _____

## 2022-04-13 NOTE — H&P
History and Physical / Pre-Sedation Assessment    Patient:  Betty Arvizu   :   1955     Intended Procedure:  Colonoscopy    HPI: 77year old male with rectal bleeding    Past Medical History:   Diagnosis Date    Acute MI ([facility]     CAD (coronary artery disease)     CHF (congestive heart failure) ([facility]     Diabetes ([facility]     Hyperlipidemia     Hypertension      Past Surgical History:   Procedure Laterality Date    CARDIAC SURGERY         Medications reviewed  Prior to Admission medications    Medication Sig Start Date End Date Taking?  Authorizing Provider   Dulaglutide (TRULICITY) 3 GT/2.1EP SOPN Inject 3 mg into the skin once a week   Yes Historical Provider, MD   telmisartan (MICARDIS) 80 MG tablet TAKE ONE TABLET BY MOUTH DAILY 3/17/22   Maggie Barr MD   rosuvastatin (CRESTOR) 40 MG tablet TAKE ONE TABLET BY MOUTH DAILY 22   Maggie Barr MD   carvedilol (COREG) 25 MG tablet TAKE ONE TABLET BY MOUTH TWICE A DAY 22   Maggie Barr MD   furosemide (LASIX) 40 MG tablet TAKE TWO TABLETS BY MOUTH DAILY 21   Maggie Barr MD   glipiZIDE (GLUCOTROL) 5 MG tablet Take by mouth daily (with breakfast)    Historical Provider, MD   amLODIPine (NORVASC) 5 MG tablet Take 1 tablet by mouth daily 21   Maggie Barr MD   vitamin D (CHOLECALCIFEROL) 25 MCG (1000 UT) TABS tablet Take 1,000 Units by mouth daily    Historical Provider, MD   empagliflozin (JARDIANCE) 10 MG tablet Take 10 mg by mouth daily    Historical Provider, MD   insulin glargine (BASAGLAR KWIKPEN) 100 UNIT/ML injection pen Inject 40 Units into the skin nightly Hold BS </= 90     Historical Provider, MD   aspirin 81 MG chewable tablet Take 1 tablet by mouth daily 18   NIXON Talbetr - [place]   glyBURIDE (DIABETA) 5 MG tablet Take 1 tablet by mouth daily (with breakfast) 18   Kar Gracia MD   metFORMIN (GLUCOPHAGE) 500 MG tablet Take 1 tablet by mouth 2 times daily (with meals)  Patient taking differently: Take 1,000 mg by mouth 2 times daily (with meals) 2 tab BID 4/17/18   Geraldine Miguel MD        Allergies: No Known Allergies    Nurses notes reviewed and agreed. Physical Exam:  Vital Signs: BP (!) 148/101   Pulse 83   Temp 97.2 °F (36.2 °C) (Temporal)   Resp 20   Ht 5' 9\" (1.753 m)   Wt 263 lb (119.3 kg)   SpO2 98%   BMI 38.84 kg/m²    Airway: Mallampati: II (soft palate, uvula, fauces visible)  Pulmonary:Normal  Cardiac:Normal  Abdomen:Normal    Pre-Procedure Assessment / Plan:  ASA: Class 3 - A patient with severe systemic disease that limits activity but is not incapacitating  Level of Sedation Plan: Moderate sedation  Post Procedure plan: Return to same level of care    I assessed the patient and find that the patient is in satisfactory condition to proceed with the planned procedure and sedation plan. I have explained the risk, benefits, and alternatives to the procedure; the patient understands and agrees to proceed.        Prema Gregg MD  4/13/2022

## 2022-04-13 NOTE — ANESTHESIA POSTPROCEDURE EVALUATION
Department of Anesthesiology  Postprocedure Note    Patient: Elise Gifford  MRN: [id number]  YOB: 1955  Date of evaluation: 4/13/2022  Time:  3:32 PM     Procedure Summary     Date: 04/13/22 Room / Location: [facility] 01 / [facility]    Anesthesia Start: 1126 Anesthesia Stop: 6885    Procedure: COLONOSCOPY POLYPECTOMY SNARE/COLD BIOPSY (N/A ) Diagnosis: (RECTAL BLEEDING)    Surgeons: Sammy Araiza MD Responsible Provider: Stanley Griffin MD    Anesthesia Type: MAC ASA Status: 3          Anesthesia Type: MAC    Rod Phase I: Rod Score: 10    Rod Phase II: Rod Score: 10    Last vitals: Reviewed and per EMR flowsheets.        Anesthesia Post Evaluation    Patient location during evaluation: PACU  Level of consciousness: awake  Airway patency: patent  Nausea & Vomiting: no nausea  Complications: no  Cardiovascular status: blood pressure returned to baseline  Respiratory status: acceptable  Hydration status: euvolemic

## 2022-05-22 PROCEDURE — 93295 DEV INTERROG REMOTE 1/2/MLT: CPT | Performed by: INTERNAL MEDICINE

## 2022-05-22 PROCEDURE — 93296 REM INTERROG EVL PM/IDS: CPT | Performed by: INTERNAL MEDICINE

## 2022-05-22 PROCEDURE — 93297 REM INTERROG DEV EVAL ICPMS: CPT | Performed by: INTERNAL MEDICINE

## 2022-05-23 ENCOUNTER — NURSE ONLY (OUTPATIENT)
Dept: CARDIOLOGY CLINIC | Age: 67
End: 2022-05-23

## 2022-05-23 DIAGNOSIS — I50.23 ACUTE ON CHRONIC SYSTOLIC CHF (CONGESTIVE HEART FAILURE) (HCC): ICD-10-CM

## 2022-05-23 DIAGNOSIS — I25.5 ISCHEMIC CARDIOMYOPATHY: ICD-10-CM

## 2022-05-23 DIAGNOSIS — Z95.810 BIVENTRICULAR ICD (IMPLANTABLE CARDIOVERTER-DEFIBRILLATOR) IN PLACE: ICD-10-CM

## 2022-05-23 NOTE — PROGRESS NOTES
Remote transmission received for patients CRT-D. Transmission shows normal sensing and pacing function. EP physician will review. See interrogation under the cardiology tab in the [address] [place] [address] [address] field for more details. Will continue to monitor remotely. Hx NSVT (coreg). Pacing (% of Time Since 21-Feb-2022)  Total * 94.9% (MVP Off)  Effective 94.7%  Episodes Since: 21-Feb-2022  2 Non-sustained VT. Thoracic impedance trend stable.

## 2022-07-18 ENCOUNTER — OFFICE VISIT (OUTPATIENT)
Dept: CARDIOLOGY CLINIC | Age: 67
End: 2022-07-18
Payer: MEDICARE

## 2022-07-18 VITALS
HEIGHT: 69 IN | WEIGHT: 255 LBS | OXYGEN SATURATION: 95 % | TEMPERATURE: 98.6 F | HEART RATE: 66 BPM | SYSTOLIC BLOOD PRESSURE: 110 MMHG | DIASTOLIC BLOOD PRESSURE: 78 MMHG | BODY MASS INDEX: 37.77 KG/M2

## 2022-07-18 DIAGNOSIS — E78.49 OTHER HYPERLIPIDEMIA: ICD-10-CM

## 2022-07-18 DIAGNOSIS — I10 PRIMARY HYPERTENSION: ICD-10-CM

## 2022-07-18 DIAGNOSIS — Z95.810 BIVENTRICULAR ICD (IMPLANTABLE CARDIOVERTER-DEFIBRILLATOR) IN PLACE: ICD-10-CM

## 2022-07-18 DIAGNOSIS — Z79.899 MEDICATION MANAGEMENT: ICD-10-CM

## 2022-07-18 DIAGNOSIS — I25.10 CORONARY ARTERY DISEASE INVOLVING NATIVE CORONARY ARTERY OF NATIVE HEART WITHOUT ANGINA PECTORIS: ICD-10-CM

## 2022-07-18 DIAGNOSIS — I25.5 ISCHEMIC CARDIOMYOPATHY: Primary | ICD-10-CM

## 2022-07-18 PROCEDURE — 1123F ACP DISCUSS/DSCN MKR DOCD: CPT | Performed by: INTERNAL MEDICINE

## 2022-07-18 PROCEDURE — 93000 ELECTROCARDIOGRAM COMPLETE: CPT | Performed by: INTERNAL MEDICINE

## 2022-07-18 PROCEDURE — 99214 OFFICE O/P EST MOD 30 MIN: CPT | Performed by: INTERNAL MEDICINE

## 2022-07-18 RX ORDER — AMLODIPINE BESYLATE 5 MG/1
5 TABLET ORAL DAILY
Qty: 90 TABLET | Refills: 3 | Status: SHIPPED | OUTPATIENT
Start: 2022-07-18

## 2022-07-18 RX ORDER — FUROSEMIDE 40 MG/1
TABLET ORAL
Qty: 180 TABLET | Refills: 2 | Status: SHIPPED | OUTPATIENT
Start: 2022-07-18

## 2022-07-18 RX ORDER — CARVEDILOL 25 MG/1
TABLET ORAL
Qty: 180 TABLET | Refills: 3 | Status: SHIPPED | OUTPATIENT
Start: 2022-07-18

## 2022-07-18 NOTE — PROGRESS NOTES
[facility]   Cardiac Followup    Referring Provider:  Chelle Aggarwal. Evi Langford MD     Chief Complaint   Patient presents with    Follow-up     6 month office visit    Coronary Artery Disease    Congestive Heart Failure    Other     No new concerns       Subjective:  Mr Rock He is being seen today for cardiology follow up; No cardiac complaints today. Past Medical History:  Anamaria Edwards is a 77 y.o. male who I originally saw as inpatient consult 12/4/17. He has PMH CAD s/p CABG 2009, severe ischemic CM EF=25-30%, s/p BiV-ICD 4/18, remote MI, CRI (follows Dr. Violeta Contreras), HLD, and HTN. Prior IRVIN in May 2007 normal.  Diagnosed A on C systolic CHF due to ischemic CM and med noncompliance. Most recent [facility] 12/6/17 no ischemia; Large sized anterior,  anteroapical, apical, septal, and inferior fixed defects c/w infarction. Most recent limited ECHO 2/6/18 EF 25-30% (same 12/17). He underwent BiV-ICD placed 4/18/18 with Dr. Frances Chaidez. Note renal US 4/8/19 unremarkable. Most recent EKG 1/26/21 probable NSR 67bpm; IVCD; right axis; lateral infarct; septal infarct; T wave change inferolateral leads consider ischemia (no [id number]/18). Taken off spironolactone by PCP 6/21 due to symptomatic hypotension. Most recent remote device check 05/23/22 2 non-sustained VT episodes. Thoracic impedance trend stable. 2 y 6 m battery longevity. Today he states he can walk upstairs and complete walking in grocery store. Accompanied today by his brother. Denies limitation with this activity. Patient currently denies any weight gain, edema, palpitations, chest pain, shortness of breath, dizziness, and syncope. He is taking all medications as prescribed, tolerating well. Weight 01/31/22 is 261# (down 2# from 7/21)    Patient is vaccinated against Covid.  Pfizer 3/3    Past Medical History:   has a past medical history of Acute MI ([address] CAD (coronary artery disease), CHF (congestive heart failure) ([address] Diabetes ([address] Hyperlipidemia, and Hypertension. Surgical History:   has a past surgical history that includes Cardiac surgery; Colonoscopy (04/13/2022); and Colonoscopy (N/A, 4/13/2022). Social History:   reports that he has never smoked. He has never used smokeless tobacco. He reports that he does not currently use alcohol. He reports that he does not use drugs. Family History:  family history with father having hx CABG (? Age) and brother s/p CABG age 58     Home Medications:  Prior to Admission medications    Medication Sig Start Date End Date Taking?  Authorizing Provider   Dulaglutide (TRULICITY) 3 GZ/2.7EY SOPN Inject 3 mg into the skin once a week   Yes Historical Provider, MD   telmisartan (MICARDIS) 80 MG tablet TAKE ONE TABLET BY MOUTH DAILY 3/17/22  Yes Codey Frazier MD   rosuvastatin (CRESTOR) 40 MG tablet TAKE ONE TABLET BY MOUTH DAILY 2/14/22  Yes Codey Frazier MD   carvedilol (COREG) 25 MG tablet TAKE ONE TABLET BY MOUTH TWICE A DAY 1/31/22  Yes Codey Frazier MD   furosemide (LASIX) 40 MG tablet TAKE TWO TABLETS BY MOUTH DAILY 11/22/21  Yes Codey Frazier MD   glipiZIDE (GLUCOTROL) 5 MG tablet Take by mouth daily (with breakfast)   Yes Historical Provider, MD   amLODIPine (NORVASC) 5 MG tablet Take 1 tablet by mouth daily 1/26/21  Yes Codey Frazier MD   vitamin D (CHOLECALCIFEROL) 25 MCG (1000 UT) TABS tablet Take 1,000 Units by mouth daily   Yes Historical Provider, MD   empagliflozin (JARDIANCE) 10 MG tablet Take 10 mg by mouth daily   Yes Historical Provider, MD   insulin glargine (LANTUS;BASAGLAR) 100 UNIT/ML injection pen Inject 40 Units into the skin nightly Hold BS </= 90    Yes Historical Provider, MD   aspirin 81 MG chewable tablet Take 1 tablet by mouth daily 4/21/18  Yes NIXON Rodriguez - BEVERLEY   glyBURIDE (DIABETA) 5 MG tablet Take 1 tablet by mouth daily (with breakfast) 4/19/18  Yes Sarah Velasco MD   metFORMIN (GLUCOPHAGE) 500 MG tablet Take 1 tablet by mouth 2 times daily (with meals)  Patient taking differently: Take 1,000 mg by mouth in the morning and 1,000 mg in the evening. Take with meals. 2 tab BID. 4/17/18  Yes Lolis Vega MD      Allergies:  Patient has no known allergies. Review of Systems:   Constitutional: there has been no unanticipated weight loss. There's been no change in energy level, sleep pattern, or activity level. Eyes: No visual changes or diplopia. No scleral icterus. ENT: No Headaches, hearing loss or vertigo. No mouth sores or sore throat. Cardiovascular: Reviewed in HPI  Respiratory: No cough or wheezing, no sputum production. No hematemesis. Gastrointestinal: No abdominal pain, appetite loss, blood in stools. No change in bowel or bladder habits. Genitourinary: No dysuria, trouble voiding, or hematuria. Musculoskeletal:  No gait disturbance, weakness or joint complaints. Integumentary: No rash or pruritis. Neurological: No headache, diplopia, change in muscle strength, numbness or tingling. No change in gait, balance, coordination, mood, affect, memory, mentation, behavior. Psychiatric: No anxiety, no depression. Endocrine: No malaise, fatigue or temperature intolerance. No excessive thirst, fluid intake, or urination. No tremor. Hematologic/Lymphatic: No abnormal bruising or bleeding, blood clots or swollen lymph nodes. Allergic/Immunologic: No nasal congestion or hives. Physical Examination:    Vitals:    07/18/22 1258   BP: 110/78   Pulse: 66   Temp: 98.6 °F (37 °C)   SpO2: 95%        Wt Readings from Last 3 Encounters:   07/18/22 255 lb (115.7 kg)   04/13/22 263 lb (119.3 kg)   01/31/22 261 lb 6.4 oz (118.6 kg)         Constitutional and General Appearance: NAD   Respiratory:  Normal excursion and expansion without use of accessory  muscles  Resp Auscultation: Clear, no crackles or wheezes  Cardiovascular:   The apical impulses not displaced  Heart tones are crisp and normal  Cervical veins are not engorged  The carotid upstroke is normal in amplitude and contour without delay or bruit  Normal S1S2, No S3, no murmur  Peripheral pulses are symmetrical and full  There is no clubbing, cyanosis of the extremities. No edema    Femoral Arteries: 2+ and equal  Pedal Pulses: 2+ and equal   Abdomen:  No masses or tenderness  Liver/Spleen: No Abnormalities Noted  Neurological/Psychiatric:  Alert and oriented in all spheres  Moves all extremities well  Exhibits normal gait balance and coordination  No abnormalities of mood, affect, memory, mentation, or behavior are noted        Skin: warm and dry    Lab Results   Component Value Date    CHOL 147 02/21/2022    CHOL 144 05/22/2020    CHOL 138 02/03/2020     Lab Results   Component Value Date    TRIG 316 (H) 02/21/2022    TRIG 256 (H) 05/22/2020    TRIG 276 (H) 02/03/2020     Lab Results   Component Value Date    HDL 33 (L) 02/21/2022    HDL 33 (L) 02/01/2021    HDL 35 (L) 05/22/2020     Lab Results   Component Value Date    LDLCALC see below 02/21/2022    LDLCALC 58 02/01/2021    LDLCALC 58 05/22/2020     Lab Results   Component Value Date    LABVLDL see below 02/21/2022    LABVLDL 49 02/01/2021    LABVLDL 51 05/22/2020    VLDL 50 04/02/2019     I have personally reviewed all labs including lipids 2/1/21    Assessment:     1. Coronary artery disease involving native heart without angina pectoris, unspecified vessel or lesion type:  Most recent Lexiscan 12/6/17 no evidence of stress induced ischemia. Large sized area infarct. There are no concerning symptoms for angina currently. 2. Chronic systolic CHF (congestive heart failure) ([address]): Clinically compensated NYHA Class I and will continue current CHF medical regimen. Most recent limited ECHO  2/6/18 EF 25-30%. Underwent BiV-ICD placed 4/18/18 with Dr. Brayden Arreola. Doing well on ARB and BB. Did not tolerate aldactone due to hypotension. 3. Ischemic cardiomyopathy: See #1 and 2 above     4.  Essential hypertension: Stable and well controlled and will continue present medical regimen: Well controlled and will continue current medical regimen. 5.      Lipids: I personally reviewed most recent from 2/21/22 (see above). Good overall except for elevated TG. I strongly encouraged better diet and taking meds as prescribed. Need to recheck and consider adding vascepa or lovaza if not improved. Plan:  Order lab work ~ lipid panel and hemoglobin A1C  ~ evaluate triglycerides and diabetes mellitus   2. EKG V-paced 85bpm (paced rhythm replaces NSR; IVCD on 1/21 study)  3. Discussed ordering stress test next office visit. 4. I recommend that the patient continue their currently prescribed medications. Their drug modifiable risk factors appear to be well controlled. I will continue to address the need/dosing of medications in future visits. 5. Follow up in 6 months. Consider nuc stress testing at that time given it will be 5 years since last testing and hx remote CABG. Scribe's attestation: This note was scribed in the presence of Emily Olivas by Jayesh Prince RN     I, Dr. Javon Diaz, personally performed the services described in this documentation, as scribed by the above signed scribe in my presence. It is both accurate and complete to my knowledge. I agree with the details independently gathered by the clinical support staff, while the remaining scribed note accurately describes my personal service to the patient. Cost of prescription medications and patient compliance have been reviewed with patient. All questions answered. Thank you for allowing me to participate in the care of this individual.     Liam Liu.  Iqra Sanderson M.D., [facility] [place]

## 2022-07-18 NOTE — PATIENT INSTRUCTIONS
Plan:  Order lab work ~ lipid panel and hemoglobin A1C  ~ evaluate triglycerides and diabetes mellitus   2. Order EKG ~ annual  3. Discussed ordering stress test next office visit. 4. I recommend that the patient continue their currently prescribed medications. Their drug modifiable risk factors appear to be well controlled. I will continue to address the need/dosing of medications in future visits. 5. Follow up in 6 months.

## 2022-08-22 ENCOUNTER — NURSE ONLY (OUTPATIENT)
Dept: CARDIOLOGY CLINIC | Age: 67
End: 2022-08-22
Payer: MEDICARE

## 2022-08-22 DIAGNOSIS — I42.9 CARDIOMYOPATHY, UNSPECIFIED TYPE (HCC): ICD-10-CM

## 2022-08-22 DIAGNOSIS — Z95.810 BIVENTRICULAR ICD (IMPLANTABLE CARDIOVERTER-DEFIBRILLATOR) IN PLACE: ICD-10-CM

## 2022-08-22 DIAGNOSIS — I25.5 ISCHEMIC CARDIOMYOPATHY: ICD-10-CM

## 2022-08-22 DIAGNOSIS — I50.23 ACUTE ON CHRONIC SYSTOLIC CHF (CONGESTIVE HEART FAILURE) (HCC): Primary | ICD-10-CM

## 2022-08-22 PROCEDURE — 93295 DEV INTERROG REMOTE 1/2/MLT: CPT | Performed by: INTERNAL MEDICINE

## 2022-08-22 PROCEDURE — 93297 REM INTERROG DEV EVAL ICPMS: CPT | Performed by: INTERNAL MEDICINE

## 2022-08-22 PROCEDURE — 93296 REM INTERROG EVL PM/IDS: CPT | Performed by: INTERNAL MEDICINE

## 2022-08-24 NOTE — PROGRESS NOTES
Remote transmission of pacemaker and/or ICD, or implanted heart monitor shows normal cardiac device function. Patient's last device interrogation was on 5/23. Estimated device longevity is 2.7 years. Patient has a history of ICM. Takes Coreg. AP 6.1%   97.1%  Effective 97%    P wave 3.0 mV  R wave 7.8 mV    Since last device interrogation, no arrhythmias recorded. TI near baseline. EP MD to review. End of 91 day monitoring period 8/22. Patient is to follow up in 3 months either remotely or in clinic. Please see the Paceart report under the Cardiology tab for more detail.

## 2022-11-21 ENCOUNTER — NURSE ONLY (OUTPATIENT)
Dept: CARDIOLOGY CLINIC | Age: 67
End: 2022-11-21
Payer: MEDICARE

## 2022-11-21 DIAGNOSIS — I25.5 ISCHEMIC CARDIOMYOPATHY: Primary | ICD-10-CM

## 2022-11-21 DIAGNOSIS — Z95.810 BIVENTRICULAR ICD (IMPLANTABLE CARDIOVERTER-DEFIBRILLATOR) IN PLACE: ICD-10-CM

## 2022-11-21 DIAGNOSIS — I50.23 ACUTE ON CHRONIC SYSTOLIC CHF (CONGESTIVE HEART FAILURE) (HCC): ICD-10-CM

## 2022-11-21 PROCEDURE — 93295 DEV INTERROG REMOTE 1/2/MLT: CPT | Performed by: INTERNAL MEDICINE

## 2022-11-21 PROCEDURE — 93296 REM INTERROG EVL PM/IDS: CPT | Performed by: INTERNAL MEDICINE

## 2022-11-21 PROCEDURE — 93297 REM INTERROG DEV EVAL ICPMS: CPT | Performed by: INTERNAL MEDICINE

## 2022-11-25 NOTE — PROGRESS NOTES
Remote transmission of pacemaker and/or ICD, or implanted heart monitor shows normal cardiac device function. Patient's last device interrogation was on 8/22. Estimated device longevity is 2.5 years. Patient has a history of ICM. Takes Coreg. AP 10.8%   96.4%  Effective 96.2%    P wave 3.3 mV  R wave 8.4 mV    Since last device interrogation, no arrhythmias recorded. TI near baseline. EP MD to review. Patient is to follow up in 3 months either remotely or in clinic. Please see the Paceart report under the Cardiology tab for more detail.

## 2023-02-12 DIAGNOSIS — Z79.899 MEDICATION MANAGEMENT: Primary | ICD-10-CM

## 2023-02-12 DIAGNOSIS — I25.5 ISCHEMIC CARDIOMYOPATHY: ICD-10-CM

## 2023-02-12 DIAGNOSIS — I10 PRIMARY HYPERTENSION: ICD-10-CM

## 2023-02-12 DIAGNOSIS — E78.49 OTHER HYPERLIPIDEMIA: ICD-10-CM

## 2023-02-13 RX ORDER — ROSUVASTATIN CALCIUM 40 MG/1
TABLET, COATED ORAL
Qty: 90 TABLET | Refills: 0 | Status: SHIPPED | OUTPATIENT
Start: 2023-02-13

## 2023-02-13 NOTE — TELEPHONE ENCOUNTER
LOV 7/18/2022  LM to call back to set up OV with SMM-He will also need to get fasting lab work done. This has been ordered.

## 2023-02-23 ENCOUNTER — NURSE ONLY (OUTPATIENT)
Dept: CARDIOLOGY CLINIC | Age: 68
End: 2023-02-23

## 2023-02-23 DIAGNOSIS — I25.5 ISCHEMIC CARDIOMYOPATHY: ICD-10-CM

## 2023-02-23 DIAGNOSIS — I50.23 ACUTE ON CHRONIC SYSTOLIC CHF (CONGESTIVE HEART FAILURE) (HCC): Primary | ICD-10-CM

## 2023-02-23 DIAGNOSIS — Z95.810 BIVENTRICULAR ICD (IMPLANTABLE CARDIOVERTER-DEFIBRILLATOR) IN PLACE: ICD-10-CM

## 2023-03-04 NOTE — PROGRESS NOTES
End of 91-day monitoring period 2/23. Pacing (% of Time Since 21-Nov-2022)  Total * 93.4% (MVP Off)  Effective 93.1%  Thoracic impedance trend stable. Episodes Since: 21-Nov-2022  1 Non-sustained VT    Remote transmission received for patients CRT-D. Transmission shows normal sensing and pacing function. EP physician will review. See interrogation under the cardiology tab in the [address] [place] [address] [address] field for more details. Will continue to monitor remotely. Hx NSVT (coreg).

## 2023-03-06 ENCOUNTER — HOSPITAL ENCOUNTER (OUTPATIENT)
Age: 68
Discharge: HOME | End: 2023-03-06
Payer: MEDICARE

## 2023-03-06 LAB
ALBUMIN SERPL-MCNC: 4.6 G/DL (ref 3.4–5)
ANION GAP SERPL CALCULATED.3IONS-SCNC: 12 MMOL/L (ref 3–16)
BUN BLDV-MCNC: 28 MG/DL (ref 7–20)
CALCIUM SERPL-MCNC: 10.1 MG/DL (ref 8.3–10.6)
CHLORIDE BLD-SCNC: 99 MMOL/L (ref 99–110)
CO2: 28 MMOL/L (ref 21–32)
CREAT SERPL-MCNC: 1.8 MG/DL (ref 0.8–1.3)
GFR SERPL CREATININE-BSD FRML MDRD: 41 ML/MIN/{1.73_M2}
GLUCOSE BLD-MCNC: 179 MG/DL (ref 70–99)
PHOSPHORUS: 3.2 MG/DL (ref 2.5–4.9)
POTASSIUM SERPL-SCNC: 4.3 MMOL/L (ref 3.5–5.1)
SODIUM BLD-SCNC: 139 MMOL/L (ref 136–145)

## 2023-03-06 PROCEDURE — 80069 RENAL FUNCTION PANEL: CPT

## 2023-03-17 RX ORDER — TELMISARTAN 80 MG/1
TABLET ORAL
Qty: 90 TABLET | Refills: 3 | Status: SHIPPED | OUTPATIENT
Start: 2023-03-17

## 2023-04-26 LAB
ALBUMIN SERPL-MCNC: 4.6 G/DL
ALP BLD-CCNC: 88 U/L
ALT SERPL-CCNC: 22 U/L
ANION GAP SERPL CALCULATED.3IONS-SCNC: 1.7 MMOL/L
AST SERPL-CCNC: 28 U/L
BASOPHILS ABSOLUTE: 0.1 /ΜL
BASOPHILS RELATIVE PERCENT: 1 %
BILIRUB SERPL-MCNC: 0.4 MG/DL (ref 0.1–1.4)
BUN BLDV-MCNC: 28 MG/DL
CALCIUM SERPL-MCNC: 10 MG/DL
CHLORIDE BLD-SCNC: 100 MMOL/L
CHOLESTEROL, TOTAL: 136 MG/DL
CHOLESTEROL/HDL RATIO: ABNORMAL
CO2: 27 MMOL/L
CREAT SERPL-MCNC: 1.81 MG/DL
EGFR: 40
EOSINOPHILS ABSOLUTE: 0.1 /ΜL
EOSINOPHILS RELATIVE PERCENT: 1 %
GLUCOSE BLD-MCNC: 93 MG/DL
HCT VFR BLD CALC: 39.1 % (ref 41–53)
HDLC SERPL-MCNC: 31 MG/DL (ref 35–70)
HEMOGLOBIN: 13.5 G/DL (ref 13.5–17.5)
LDL CHOLESTEROL CALCULATED: 66 MG/DL (ref 0–160)
LYMPHOCYTES ABSOLUTE: 1.6 /ΜL
LYMPHOCYTES RELATIVE PERCENT: 23 %
MCH RBC QN AUTO: 33.3 PG
MCHC RBC AUTO-ENTMCNC: 34.5 G/DL
MCV RBC AUTO: 96 FL
MONOCYTES ABSOLUTE: 0.6 /ΜL
MONOCYTES RELATIVE PERCENT: 9 %
NEUTROPHILS ABSOLUTE: 4.4 /ΜL
NEUTROPHILS RELATIVE PERCENT: 65 %
NONHDLC SERPL-MCNC: ABNORMAL MG/DL
PLATELET # BLD: 172 K/ΜL
PMV BLD AUTO: ABNORMAL FL
POTASSIUM SERPL-SCNC: 4.2 MMOL/L
RBC # BLD: 4.06 10^6/ΜL
SODIUM BLD-SCNC: 144 MMOL/L
TOTAL PROTEIN: 7.3
TRIGL SERPL-MCNC: 242 MG/DL
VLDLC SERPL CALC-MCNC: 39 MG/DL
WBC # BLD: 6.8 10^3/ML

## 2023-05-17 ENCOUNTER — HOSPITAL ENCOUNTER (OUTPATIENT)
Dept: NON INVASIVE DIAGNOSTICS | Age: 68
Discharge: HOME | End: 2023-05-17
Payer: MEDICARE

## 2023-05-17 ENCOUNTER — HOSPITAL ENCOUNTER (OUTPATIENT)
Dept: NUCLEAR MEDICINE | Age: 68
Discharge: HOME | End: 2023-05-17
Payer: MEDICARE

## 2023-05-17 DIAGNOSIS — I25.5 ISCHEMIC CARDIOMYOPATHY: ICD-10-CM

## 2023-05-17 DIAGNOSIS — I25.10 CORONARY ARTERY DISEASE INVOLVING NATIVE CORONARY ARTERY OF NATIVE HEART WITHOUT ANGINA PECTORIS: ICD-10-CM

## 2023-05-17 LAB
LV EF: 25 %
LVEF MODALITY: NORMAL

## 2023-05-17 PROCEDURE — 6360000002 HC RX W HCPCS: Performed by: INTERNAL MEDICINE

## 2023-05-17 PROCEDURE — A9502 TC99M TETROFOSMIN: HCPCS | Performed by: INTERNAL MEDICINE

## 2023-05-17 PROCEDURE — 93017 CV STRESS TEST TRACING ONLY: CPT

## 2023-05-17 PROCEDURE — 3430000000 HC RX DIAGNOSTIC RADIOPHARMACEUTICAL: Performed by: INTERNAL MEDICINE

## 2023-05-17 PROCEDURE — 78452 HT MUSCLE IMAGE SPECT MULT: CPT

## 2023-05-17 RX ADMIN — TETROFOSMIN 11.7 MILLICURIE: 1.38 INJECTION, POWDER, LYOPHILIZED, FOR SOLUTION INTRAVENOUS at 10:05

## 2023-05-17 RX ADMIN — TETROFOSMIN 33.8 MILLICURIE: 1.38 INJECTION, POWDER, LYOPHILIZED, FOR SOLUTION INTRAVENOUS at 11:10

## 2023-05-17 RX ADMIN — REGADENOSON 0.4 MG: 0.08 INJECTION, SOLUTION INTRAVENOUS at 11:10

## 2023-05-17 NOTE — PROGRESS NOTES
Pt completed stress portion of cardiac stress test. Patient complained of feeling \"weird\" after lexiscan, denied chest pain, all symptoms resolved in recovery. Pt is discharged to nuclear department for final scan. Nuclear tech will remove PIV. Discharge instructions given to pt. Pt verbalizes understanding to discharge instructions.

## 2023-05-18 ENCOUNTER — TELEPHONE (OUTPATIENT)
Dept: CARDIOLOGY CLINIC | Age: 68
End: 2023-05-18

## 2023-05-18 NOTE — TELEPHONE ENCOUNTER
Spoke to [place] pt's sister, per pt's HIPAA form. Relayed [facility] message, [place] v/shawn.

## 2023-05-18 NOTE — TELEPHONE ENCOUNTER
----- Message from Ethel Conde MD sent at 5/18/2023  9:19 AM EDT -----  Stress test is stable no significant change since 12.6. 17.

## 2023-05-22 ENCOUNTER — NURSE ONLY (OUTPATIENT)
Dept: CARDIOLOGY CLINIC | Age: 68
End: 2023-05-22

## 2023-05-22 DIAGNOSIS — I50.23 ACUTE ON CHRONIC SYSTOLIC CHF (CONGESTIVE HEART FAILURE) (HCC): ICD-10-CM

## 2023-05-22 DIAGNOSIS — I25.5 ISCHEMIC CARDIOMYOPATHY: Primary | ICD-10-CM

## 2023-05-22 DIAGNOSIS — Z95.810 BIVENTRICULAR ICD (IMPLANTABLE CARDIOVERTER-DEFIBRILLATOR) IN PLACE: ICD-10-CM

## 2023-05-23 NOTE — PROGRESS NOTES
End of 91-day monitoring period 5/22  Pacing (% of Time Since 20-Feb-2023)  Total * 95.2% (MVP Off)  Effective 95.0%  1 NSVT. Possible OptiVol fluid accumulation: 05-May-2023 -- ongoing, consistent w/ decreased TI indicating possible fluid accumulation. SMM please review. Remote transmission received for patients CRT-D. Transmission shows normal sensing and pacing function. EP physician will review. See interrogation under the cardiology tab in the [address] [place] [address] [address] field for more details. Will continue to monitor remotely. Hx NSVT (coreg).

## 2023-09-12 PROCEDURE — 93297 REM INTERROG DEV EVAL ICPMS: CPT | Performed by: NURSE PRACTITIONER

## 2023-09-12 PROCEDURE — G2066 INTER DEVC REMOTE 30D: HCPCS | Performed by: NURSE PRACTITIONER

## 2023-09-12 PROCEDURE — 93295 DEV INTERROG REMOTE 1/2/MLT: CPT | Performed by: INTERNAL MEDICINE

## 2023-09-12 PROCEDURE — 93296 REM INTERROG EVL PM/IDS: CPT | Performed by: INTERNAL MEDICINE

## 2023-09-15 ENCOUNTER — HOSPITAL ENCOUNTER (OUTPATIENT)
Age: 68
Discharge: HOME | End: 2023-09-15
Payer: MEDICARE

## 2023-09-15 LAB
25(OH)D3 SERPL-MCNC: 34 NG/ML
ALBUMIN SERPL-MCNC: 4.2 G/DL (ref 3.4–5)
ANION GAP SERPL CALCULATED.3IONS-SCNC: 16 MMOL/L (ref 3–16)
BILIRUB UR QL STRIP.AUTO: NEGATIVE
BUN SERPL-MCNC: 34 MG/DL (ref 7–20)
CALCIUM SERPL-MCNC: 8.9 MG/DL (ref 8.3–10.6)
CHLORIDE SERPL-SCNC: 104 MMOL/L (ref 99–110)
CLARITY UR: CLEAR
CO2 SERPL-SCNC: 21 MMOL/L (ref 21–32)
COLOR UR: YELLOW
CREAT SERPL-MCNC: 2.1 MG/DL (ref 0.8–1.3)
CREAT UR-MCNC: 77.1 MG/DL (ref 39–259)
DEPRECATED RDW RBC AUTO: 15.8 % (ref 12.4–15.4)
GFR SERPLBLD CREATININE-BSD FMLA CKD-EPI: 34 ML/MIN/{1.73_M2}
GLUCOSE SERPL-MCNC: 199 MG/DL (ref 70–99)
GLUCOSE UR STRIP.AUTO-MCNC: >=1000 MG/DL
HCT VFR BLD AUTO: 36.8 % (ref 40.5–52.5)
HGB BLD-MCNC: 12.3 G/DL (ref 13.5–17.5)
HGB UR QL STRIP.AUTO: NEGATIVE
KETONES UR STRIP.AUTO-MCNC: NEGATIVE MG/DL
LEUKOCYTE ESTERASE UR QL STRIP.AUTO: NEGATIVE
MCH RBC QN AUTO: 32.1 PG (ref 26–34)
MCHC RBC AUTO-ENTMCNC: 33.5 G/DL (ref 31–36)
MCV RBC AUTO: 95.9 FL (ref 80–100)
NITRITE UR QL STRIP.AUTO: NEGATIVE
PH UR STRIP.AUTO: 5.5 [PH] (ref 5–8)
PHOSPHATE SERPL-MCNC: 3.9 MG/DL (ref 2.5–4.9)
PLATELET # BLD AUTO: 185 K/UL (ref 135–450)
PMV BLD AUTO: 7.8 FL (ref 5–10.5)
POTASSIUM SERPL-SCNC: 4 MMOL/L (ref 3.5–5.1)
PROT UR STRIP.AUTO-MCNC: NEGATIVE MG/DL
PROT UR-MCNC: 6 MG/DL
PROT/CREAT UR-RTO: 0.1 MG/DL
RBC # BLD AUTO: 3.84 M/UL (ref 4.2–5.9)
SODIUM SERPL-SCNC: 141 MMOL/L (ref 136–145)
SP GR UR STRIP.AUTO: 1.01 (ref 1–1.03)
UA DIPSTICK W REFLEX MICRO PNL UR: ABNORMAL
URN SPEC COLLECT METH UR: ABNORMAL
UROBILINOGEN UR STRIP-ACNC: 0.2 E.U./DL
WBC # BLD AUTO: 7.1 K/UL (ref 4–11)

## 2023-09-15 PROCEDURE — 85027 COMPLETE CBC AUTOMATED: CPT

## 2023-09-15 PROCEDURE — 84156 ASSAY OF PROTEIN URINE: CPT

## 2023-09-15 PROCEDURE — 82570 ASSAY OF URINE CREATININE: CPT

## 2023-09-15 PROCEDURE — 81003 URINALYSIS AUTO W/O SCOPE: CPT

## 2023-09-15 PROCEDURE — 82306 VITAMIN D 25 HYDROXY: CPT

## 2023-09-15 PROCEDURE — 36415 COLL VENOUS BLD VENIPUNCTURE: CPT

## 2023-09-15 PROCEDURE — 80069 RENAL FUNCTION PANEL: CPT

## 2023-10-03 DIAGNOSIS — I25.5 ISCHEMIC CARDIOMYOPATHY: ICD-10-CM

## 2023-10-03 DIAGNOSIS — I25.10 CORONARY ARTERY DISEASE INVOLVING NATIVE CORONARY ARTERY OF NATIVE HEART WITHOUT ANGINA PECTORIS: ICD-10-CM

## 2023-10-03 RX ORDER — SPIRONOLACTONE 25 MG/1
12.5 TABLET ORAL DAILY
Qty: 45 TABLET | Refills: 1 | Status: SHIPPED | OUTPATIENT
Start: 2023-10-03

## 2023-10-16 ENCOUNTER — OFFICE VISIT (OUTPATIENT)
Dept: CARDIOLOGY CLINIC | Age: 68
End: 2023-10-16
Payer: MEDICARE

## 2023-10-16 ENCOUNTER — HOSPITAL ENCOUNTER (OUTPATIENT)
Age: 68
Discharge: HOME | End: 2023-10-16
Payer: MEDICARE

## 2023-10-16 VITALS
BODY MASS INDEX: 36.94 KG/M2 | OXYGEN SATURATION: 98 % | DIASTOLIC BLOOD PRESSURE: 72 MMHG | HEIGHT: 69 IN | SYSTOLIC BLOOD PRESSURE: 128 MMHG | WEIGHT: 249.4 LBS | HEART RATE: 80 BPM

## 2023-10-16 DIAGNOSIS — I25.5 ISCHEMIC CARDIOMYOPATHY: ICD-10-CM

## 2023-10-16 DIAGNOSIS — Z79.899 MEDICATION MANAGEMENT: ICD-10-CM

## 2023-10-16 DIAGNOSIS — I50.23 ACUTE ON CHRONIC SYSTOLIC CHF (CONGESTIVE HEART FAILURE) (HCC): Primary | ICD-10-CM

## 2023-10-16 LAB
ANION GAP SERPL CALCULATED.3IONS-SCNC: 12 MMOL/L (ref 3–16)
BUN SERPL-MCNC: 24 MG/DL (ref 7–20)
CALCIUM SERPL-MCNC: 8.9 MG/DL (ref 8.3–10.6)
CHLORIDE SERPL-SCNC: 105 MMOL/L (ref 99–110)
CO2 SERPL-SCNC: 25 MMOL/L (ref 21–32)
CREAT SERPL-MCNC: 1.6 MG/DL (ref 0.8–1.3)
GFR SERPLBLD CREATININE-BSD FMLA CKD-EPI: 47 ML/MIN/{1.73_M2}
GLUCOSE SERPL-MCNC: 127 MG/DL (ref 70–99)
POTASSIUM SERPL-SCNC: 4.3 MMOL/L (ref 3.5–5.1)
SODIUM SERPL-SCNC: 142 MMOL/L (ref 136–145)

## 2023-10-16 PROCEDURE — 99214 OFFICE O/P EST MOD 30 MIN: CPT | Performed by: INTERNAL MEDICINE

## 2023-10-16 PROCEDURE — 36415 COLL VENOUS BLD VENIPUNCTURE: CPT

## 2023-10-16 PROCEDURE — 3078F DIAST BP <80 MM HG: CPT | Performed by: INTERNAL MEDICINE

## 2023-10-16 PROCEDURE — 3074F SYST BP LT 130 MM HG: CPT | Performed by: INTERNAL MEDICINE

## 2023-10-16 PROCEDURE — 1123F ACP DISCUSS/DSCN MKR DOCD: CPT | Performed by: INTERNAL MEDICINE

## 2023-10-16 PROCEDURE — 80048 BASIC METABOLIC PNL TOTAL CA: CPT

## 2023-10-16 PROCEDURE — 93000 ELECTROCARDIOGRAM COMPLETE: CPT | Performed by: INTERNAL MEDICINE

## 2023-10-16 RX ORDER — OMEPRAZOLE 40 MG/1
CAPSULE, DELAYED RELEASE ORAL
COMMUNITY
Start: 2023-09-09

## 2023-10-16 NOTE — PATIENT INSTRUCTIONS
Plan:  Reviewed EKG today   Ordered BMP to assess kidney function and potassium   Reviewed medications. Refills as warranted. Labs reviewed. Continue to follow with Dr. Samanta Hopkins for Nephrology.    Follow up in 6 months

## 2023-10-16 NOTE — PROGRESS NOTES
[facility]   Cardiac Followup    Referring Provider:  Destiny Guevara MD     Chief Complaint   Patient presents with    Follow-up    Congestive Heart Failure    Cardiomyopathy    Hypertension    Coronary Artery Disease    Hyperlipidemia       I originally saw as inpatient consult 12/4/17. Subjective:  Mr Mercedez Atwood is being seen today for cardiology follow up; no complaints. Past Medical History:  Jassi Jennings is a 79 y.o. male who has PMH CAD s/p CABG 2009, severe ICM EF=25-30%, s/p BiV-ICD 4/18, remote MI, CRI (follows Dr. Delmis Monroy), HLD, and HTN. Diagnosed A on C systolic CHF due to ICM and med noncompliance. Limited ECHO 2/6/18 EF 25-30% (same 12/17). S/P BiV-ICD placed 4/18/18 with Dr. Barry Johns. Note renal US 4/8/19 unremarkable. Taken off spironolactone by PCP 6/21 due to symptomatic hypotension but restarted 4/23 and tolerating. EKG 07/18/22 V-paced 85bpm (paced rhythm replaces NSR; IVCD on 1/21 study). Rachell nuc 5/17/2023 EF=25%; moderate con-infarct ischemia; inferoseptal HK/scar (no sig change 12/17). Device check 9/14/2023 EDGAR 2 yrs; AP 9.63%; RV 93.38%; RV 95%; AT/AF 0%. Today, he reports feeling good overall. States he walks over a mile and feels good. Patient currently denies any weight gain, edema, palpitations, chest pain, shortness of breath, dizziness, and syncope. Takes medications as prescribed. Started Spironolactone after consulting with Dr. Delmis Monroy. Sister accompanies him to office today. Weight today is 249# (No change from 4/10/2023)      Past Medical History:   has a past medical history of Acute MI ([address]), CAD (coronary artery disease), CHF (congestive heart failure) ([address]), Diabetes ([address]), Hyperlipidemia, and Hypertension. Surgical History:   has a past surgical history that includes Cardiac surgery; Colonoscopy (04/13/2022); and Colonoscopy (N/A, 4/13/2022). Social History:   reports that he has never smoked.  He has never used smokeless tobacco. He
or wheezing, no sputum production. No hematemesis. Gastrointestinal: No abdominal pain, appetite loss, blood in stools. No change in bowel or bladder habits. Genitourinary: No dysuria, trouble voiding, or hematuria. Musculoskeletal:  No gait disturbance, weakness or joint complaints. Integumentary: No rash or pruritis. Neurological: No headache, diplopia, change in muscle strength, numbness or tingling. No change in gait, balance, coordination, mood, affect, memory, mentation, behavior. Psychiatric: No anxiety, no depression. Endocrine: No malaise, fatigue or temperature intolerance. No excessive thirst, fluid intake, or urination. No tremor. Hematologic/Lymphatic: No abnormal bruising or bleeding, blood clots or swollen lymph nodes. Allergic/Immunologic: No nasal congestion or hives. Physical Examination:    There were no vitals filed for this visit. Wt Readings from Last 3 Encounters:   04/10/23 249 lb (112.9 kg)   07/18/22 255 lb (115.7 kg)   04/13/22 263 lb (119.3 kg)         Constitutional and General Appearance: NAD   Respiratory:  Normal excursion and expansion without use of accessory  muscles  Resp Auscultation: Clear, no crackles or wheezes  Cardiovascular: The apical impulses not displaced  Heart tones are crisp and normal  Cervical veins are not engorged  The carotid upstroke is normal in amplitude and contour without delay or bruit  Normal S1S2, No S3, no murmur  Peripheral pulses are symmetrical and full  There is no clubbing, cyanosis of the extremities.   No edema    Femoral Arteries: 2+ and equal  Pedal Pulses: 2+ and equal   Abdomen:  No masses or tenderness  Liver/Spleen: No Abnormalities Noted  Neurological/Psychiatric:  Alert and oriented in all spheres  Moves all extremities well  Exhibits normal gait balance and coordination  No abnormalities of mood, affect, memory, mentation, or behavior are noted        Skin: warm and dry    Lab Results   Component Value Date    CHOL

## 2023-10-17 ENCOUNTER — TELEPHONE (OUTPATIENT)
Dept: CARDIOLOGY CLINIC | Age: 68
End: 2023-10-17

## 2023-10-17 PROCEDURE — G2066 INTER DEVC REMOTE 30D: HCPCS | Performed by: NURSE PRACTITIONER

## 2023-10-17 PROCEDURE — 93297 REM INTERROG DEV EVAL ICPMS: CPT | Performed by: NURSE PRACTITIONER

## 2023-10-17 NOTE — TELEPHONE ENCOUNTER
Clarissa Sellers MD  P [facility] Staff  Kidney function improved. Good news.  cpm       Left vm for patient (per HIPPA)

## 2023-11-03 ENCOUNTER — NURSE ONLY (OUTPATIENT)
Dept: CARDIOLOGY CLINIC | Age: 68
End: 2023-11-03
Payer: MEDICARE

## 2023-11-03 DIAGNOSIS — I25.5 ISCHEMIC CARDIOMYOPATHY: ICD-10-CM

## 2023-11-03 DIAGNOSIS — Z95.810 BIVENTRICULAR ICD (IMPLANTABLE CARDIOVERTER-DEFIBRILLATOR) IN PLACE: Primary | ICD-10-CM

## 2023-11-03 PROCEDURE — 93284 PRGRMG EVAL IMPLANTABLE DFB: CPT | Performed by: INTERNAL MEDICINE

## 2023-11-21 PROCEDURE — G2066 INTER DEVC REMOTE 30D: HCPCS | Performed by: NURSE PRACTITIONER

## 2023-11-21 PROCEDURE — 93297 REM INTERROG DEV EVAL ICPMS: CPT | Performed by: NURSE PRACTITIONER

## 2023-12-12 PROCEDURE — 93295 DEV INTERROG REMOTE 1/2/MLT: CPT | Performed by: INTERNAL MEDICINE

## 2023-12-12 PROCEDURE — 93296 REM INTERROG EVL PM/IDS: CPT | Performed by: INTERNAL MEDICINE

## 2023-12-22 PROCEDURE — G2066 INTER DEVC REMOTE 30D: HCPCS | Performed by: NURSE PRACTITIONER

## 2023-12-22 PROCEDURE — 93297 REM INTERROG DEV EVAL ICPMS: CPT | Performed by: NURSE PRACTITIONER

## 2024-01-12 ENCOUNTER — HOSPITAL ENCOUNTER (OUTPATIENT)
Age: 69
Discharge: HOME | End: 2024-01-12
Payer: MEDICAID

## 2024-01-12 LAB
ALBUMIN SERPL-MCNC: 4.6 G/DL (ref 3.4–5)
ANION GAP SERPL CALCULATED.3IONS-SCNC: 14 MMOL/L (ref 3–16)
BUN SERPL-MCNC: 38 MG/DL (ref 7–20)
CALCIUM SERPL-MCNC: 10.4 MG/DL (ref 8.3–10.6)
CHLORIDE SERPL-SCNC: 98 MMOL/L (ref 99–110)
CO2 SERPL-SCNC: 26 MMOL/L (ref 21–32)
CREAT SERPL-MCNC: 2 MG/DL (ref 0.8–1.3)
DEPRECATED RDW RBC AUTO: 15.8 % (ref 12.4–15.4)
GFR SERPLBLD CREATININE-BSD FMLA CKD-EPI: 36 ML/MIN/{1.73_M2}
GLUCOSE SERPL-MCNC: 133 MG/DL (ref 70–99)
HCT VFR BLD AUTO: 41.4 % (ref 40.5–52.5)
HGB BLD-MCNC: 14 G/DL (ref 13.5–17.5)
MCH RBC QN AUTO: 31.7 PG (ref 26–34)
MCHC RBC AUTO-ENTMCNC: 33.7 G/DL (ref 31–36)
MCV RBC AUTO: 93.9 FL (ref 80–100)
PHOSPHATE SERPL-MCNC: 3.3 MG/DL (ref 2.5–4.9)
PLATELET # BLD AUTO: 204 K/UL (ref 135–450)
PMV BLD AUTO: 8.2 FL (ref 5–10.5)
POTASSIUM SERPL-SCNC: 4.1 MMOL/L (ref 3.5–5.1)
RBC # BLD AUTO: 4.4 M/UL (ref 4.2–5.9)
SODIUM SERPL-SCNC: 138 MMOL/L (ref 136–145)
WBC # BLD AUTO: 8 K/UL (ref 4–11)

## 2024-01-12 PROCEDURE — 85027 COMPLETE CBC AUTOMATED: CPT

## 2024-01-12 PROCEDURE — 80069 RENAL FUNCTION PANEL: CPT

## 2024-01-22 PROCEDURE — 93297 REM INTERROG DEV EVAL ICPMS: CPT | Performed by: NURSE PRACTITIONER

## 2024-02-22 PROCEDURE — 93297 REM INTERROG DEV EVAL ICPMS: CPT | Performed by: NURSE PRACTITIONER

## 2024-03-19 RX ORDER — TELMISARTAN 80 MG/1
80 TABLET ORAL DAILY
Qty: 90 TABLET | Refills: 3 | Status: SHIPPED | OUTPATIENT
Start: 2024-03-19

## 2024-03-31 DIAGNOSIS — I25.5 ISCHEMIC CARDIOMYOPATHY: ICD-10-CM

## 2024-03-31 DIAGNOSIS — I25.10 CORONARY ARTERY DISEASE INVOLVING NATIVE CORONARY ARTERY OF NATIVE HEART WITHOUT ANGINA PECTORIS: ICD-10-CM

## 2024-03-31 DIAGNOSIS — I10 PRIMARY HYPERTENSION: ICD-10-CM

## 2024-03-31 DIAGNOSIS — Z79.899 MEDICATION MANAGEMENT: Primary | ICD-10-CM

## 2024-03-31 DIAGNOSIS — E78.49 OTHER HYPERLIPIDEMIA: ICD-10-CM

## 2024-04-01 RX ORDER — AMLODIPINE BESYLATE 5 MG/1
5 TABLET ORAL DAILY
Qty: 90 TABLET | Refills: 0 | Status: SHIPPED | OUTPATIENT
Start: 2024-04-01

## 2024-04-01 RX ORDER — CARVEDILOL 25 MG/1
TABLET ORAL
Qty: 180 TABLET | Refills: 0 | Status: SHIPPED | OUTPATIENT
Start: 2024-04-01

## 2024-04-15 NOTE — PROGRESS NOTES
normal in amplitude and contour without delay or bruit  Normal S1S2, No S3, soft IRASEMA  Peripheral pulses are symmetrical and full  There is no clubbing, cyanosis of the extremities.  No edema    Femoral Arteries: 2+ and equal  Pedal Pulses: 2+ and equal   Abdomen:  No masses or tenderness  Liver/Spleen: No Abnormalities Noted  Neurological/Psychiatric:  Alert and oriented in all spheres  Moves all extremities well  Exhibits normal gait balance and coordination  No abnormalities of mood, affect, memory, mentation, or behavior are noted        Skin: warm and dry    Lab Results   Component Value Date    CHOL 199 04/17/2024    CHOL 136 04/26/2023    CHOL 147 02/21/2022     Lab Results   Component Value Date    TRIG 342 (H) 04/17/2024    TRIG 242 04/26/2023    TRIG 316 (H) 02/21/2022     Lab Results   Component Value Date    HDL 32 (L) 04/17/2024    HDL 31 (A) 04/26/2023    HDL 33 (L) 02/21/2022     Lab Results   Component Value Date    LDLCALC see below 04/17/2024    LDLCALC 66 04/26/2023    LDLCALC see below 02/21/2022     Lab Results   Component Value Date    VLDL 39 04/26/2023    VLDL 50 04/02/2019     I have personally reviewed all labs including lipids 2/1/21    Assessment:     1. Coronary artery disease involving native heart without angina pectoris, unspecified vessel or lesion type: Rachell nuc 5/17/2023 EF=25%; moderate con-infarct ischemia; inferoseptal HK/scar (no sig change 12/17). There are no concerning symptoms for angina currently.       2. Chronic systolic CHF (congestive heart failure) (HCC): Clinically compensated NYHA Class I and will continue current CHF medical regimen. ECHO  2/6/18 EF 25-30%. S/P BiV-ICD 4/18/18. Doing well on ARB, BB, SGLT2i, and low dose MRA. Due to prior low BP issues with MRA I will not titrate further.      3. Ischemic cardiomyopathy: See #1 and 2 above. RV paced 92% on 4/10/24 device check. Will have EP check to see if settings need changed.     4. Essential hypertension:

## 2024-04-17 ENCOUNTER — HOSPITAL ENCOUNTER (OUTPATIENT)
Age: 69
Discharge: HOME | End: 2024-04-17
Payer: MEDICARE

## 2024-04-17 ENCOUNTER — TELEPHONE (OUTPATIENT)
Dept: CARDIOLOGY CLINIC | Age: 69
End: 2024-04-17

## 2024-04-17 DIAGNOSIS — I10 PRIMARY HYPERTENSION: ICD-10-CM

## 2024-04-17 DIAGNOSIS — I25.10 CORONARY ARTERY DISEASE INVOLVING NATIVE CORONARY ARTERY OF NATIVE HEART WITHOUT ANGINA PECTORIS: ICD-10-CM

## 2024-04-17 DIAGNOSIS — E78.49 OTHER HYPERLIPIDEMIA: ICD-10-CM

## 2024-04-17 DIAGNOSIS — I25.5 ISCHEMIC CARDIOMYOPATHY: Primary | ICD-10-CM

## 2024-04-17 DIAGNOSIS — I25.5 ISCHEMIC CARDIOMYOPATHY: ICD-10-CM

## 2024-04-17 DIAGNOSIS — Z79.899 MEDICATION MANAGEMENT: ICD-10-CM

## 2024-04-17 LAB
ALBUMIN SERPL-MCNC: 4.1 G/DL (ref 3.4–5)
ALBUMIN/GLOB SERPL: 1.1 {RATIO} (ref 1.1–2.2)
ALP SERPL-CCNC: 178 U/L (ref 40–129)
ALT SERPL-CCNC: 63 U/L (ref 10–40)
ANION GAP SERPL CALCULATED.3IONS-SCNC: 11 MMOL/L (ref 3–16)
AST SERPL-CCNC: 33 U/L (ref 15–37)
BASOPHILS # BLD: 0 K/UL (ref 0–0.2)
BASOPHILS NFR BLD: 0.7 %
BILIRUB SERPL-MCNC: 0.6 MG/DL (ref 0–1)
BUN SERPL-MCNC: 30 MG/DL (ref 7–20)
CALCIUM SERPL-MCNC: 9.3 MG/DL (ref 8.3–10.6)
CHLORIDE SERPL-SCNC: 105 MMOL/L (ref 99–110)
CO2 SERPL-SCNC: 24 MMOL/L (ref 21–32)
CREAT SERPL-MCNC: 1.5 MG/DL (ref 0.8–1.3)
DEPRECATED RDW RBC AUTO: 15 % (ref 12.4–15.4)
EOSINOPHIL # BLD: 0.1 K/UL (ref 0–0.6)
EOSINOPHIL NFR BLD: 2 %
GFR SERPLBLD CREATININE-BSD FMLA CKD-EPI: 50 ML/MIN/{1.73_M2}
GLUCOSE SERPL-MCNC: 233 MG/DL (ref 70–99)
HCT VFR BLD AUTO: 39.2 % (ref 40.5–52.5)
HGB BLD-MCNC: 13.3 G/DL (ref 13.5–17.5)
LYMPHOCYTES # BLD: 1.4 K/UL (ref 1–5.1)
LYMPHOCYTES NFR BLD: 24 %
MCH RBC QN AUTO: 32.2 PG (ref 26–34)
MCHC RBC AUTO-ENTMCNC: 33.9 G/DL (ref 31–36)
MCV RBC AUTO: 94.9 FL (ref 80–100)
MONOCYTES # BLD: 0.5 K/UL (ref 0–1.3)
MONOCYTES NFR BLD: 9.2 %
NEUTROPHILS # BLD: 3.7 K/UL (ref 1.7–7.7)
NEUTROPHILS NFR BLD: 64.1 %
PLATELET # BLD AUTO: 161 K/UL (ref 135–450)
PMV BLD AUTO: 7.9 FL (ref 5–10.5)
POTASSIUM SERPL-SCNC: 4.5 MMOL/L (ref 3.5–5.1)
PROT SERPL-MCNC: 7.9 G/DL (ref 6.4–8.2)
RBC # BLD AUTO: 4.13 M/UL (ref 4.2–5.9)
SODIUM SERPL-SCNC: 140 MMOL/L (ref 136–145)
WBC # BLD AUTO: 5.8 K/UL (ref 4–11)

## 2024-04-17 PROCEDURE — 84443 ASSAY THYROID STIM HORMONE: CPT

## 2024-04-17 PROCEDURE — 85025 COMPLETE CBC W/AUTO DIFF WBC: CPT

## 2024-04-17 PROCEDURE — 80061 LIPID PANEL: CPT

## 2024-04-17 PROCEDURE — 80053 COMPREHEN METABOLIC PANEL: CPT

## 2024-04-17 PROCEDURE — 36415 COLL VENOUS BLD VENIPUNCTURE: CPT

## 2024-04-17 NOTE — TELEPHONE ENCOUNTER
Talked with sister (per HIPAA). Relayed message and she v/u.      ----- Message from Pedro Tang MD sent at 4/17/2024 12:15 PM EDT -----  Kidney function stable and labs OK except one liver test mildly elevated. Not concerning and expected given on high dose crestor. CPM and recheck FLP and LFT's in 6 months.

## 2024-04-18 ENCOUNTER — TELEPHONE (OUTPATIENT)
Dept: CARDIOLOGY CLINIC | Age: 69
End: 2024-04-18

## 2024-04-18 DIAGNOSIS — E11.69 TYPE 2 DIABETES MELLITUS WITH OTHER SPECIFIED COMPLICATION, UNSPECIFIED WHETHER LONG TERM INSULIN USE (HCC): Primary | ICD-10-CM

## 2024-04-18 LAB
CHOLEST SERPL-MCNC: 199 MG/DL (ref 0–199)
HDLC SERPL-MCNC: 32 MG/DL (ref 40–60)
LDLC SERPL CALC-MCNC: ABNORMAL MG/DL
LDLC SERPL-MCNC: 97 MG/DL
TRIGL SERPL-MCNC: 342 MG/DL (ref 0–150)
TSH SERPL DL<=0.005 MIU/L-ACNC: 1.07 UIU/ML (ref 0.27–4.2)
VLDLC SERPL CALC-MCNC: ABNORMAL MG/DL

## 2024-04-18 NOTE — TELEPHONE ENCOUNTER
Spoke with sister and she notes that pcp has been trying to get him to go to endocrinologist and he declines to go.  She will speak with him again and if he decides to go she will jarad us back to let us know.  Referral has bee placed in case he does change his mind.

## 2024-04-18 NOTE — TELEPHONE ENCOUNTER
----- Message from Pedro Tang MD sent at 4/18/2024 12:56 PM EDT -----  Was he fasting? If yes, then what is his latest hemoglobin A1c. Prior numbers high and if DM not well controlled he will have high TG. Let me know. Has he seen PCP recently and how is DM doing per them?

## 2024-04-24 ENCOUNTER — OFFICE VISIT (OUTPATIENT)
Dept: CARDIOLOGY CLINIC | Age: 69
End: 2024-04-24
Payer: MEDICARE

## 2024-04-24 ENCOUNTER — TELEPHONE (OUTPATIENT)
Dept: CARDIOLOGY CLINIC | Age: 69
End: 2024-04-24

## 2024-04-24 VITALS
HEART RATE: 66 BPM | DIASTOLIC BLOOD PRESSURE: 80 MMHG | SYSTOLIC BLOOD PRESSURE: 110 MMHG | BODY MASS INDEX: 35.87 KG/M2 | WEIGHT: 242.2 LBS | HEIGHT: 69 IN | OXYGEN SATURATION: 97 %

## 2024-04-24 DIAGNOSIS — I25.10 CORONARY ARTERY DISEASE INVOLVING NATIVE CORONARY ARTERY OF NATIVE HEART WITHOUT ANGINA PECTORIS: ICD-10-CM

## 2024-04-24 DIAGNOSIS — Z95.810 BIVENTRICULAR ICD (IMPLANTABLE CARDIOVERTER-DEFIBRILLATOR) IN PLACE: Primary | ICD-10-CM

## 2024-04-24 DIAGNOSIS — I25.5 ISCHEMIC CARDIOMYOPATHY: ICD-10-CM

## 2024-04-24 DIAGNOSIS — I10 PRIMARY HYPERTENSION: ICD-10-CM

## 2024-04-24 DIAGNOSIS — Z79.899 MEDICATION MANAGEMENT: ICD-10-CM

## 2024-04-24 DIAGNOSIS — E78.49 OTHER HYPERLIPIDEMIA: ICD-10-CM

## 2024-04-24 DIAGNOSIS — E13.649 UNCONTROLLED DIABETES MELLITUS OF OTHER TYPE WITH HYPOGLYCEMIA, UNSPECIFIED HYPOGLYCEMIA COMA STATUS (HCC): ICD-10-CM

## 2024-04-24 PROCEDURE — 1123F ACP DISCUSS/DSCN MKR DOCD: CPT | Performed by: INTERNAL MEDICINE

## 2024-04-24 PROCEDURE — 99214 OFFICE O/P EST MOD 30 MIN: CPT | Performed by: INTERNAL MEDICINE

## 2024-04-24 PROCEDURE — 3074F SYST BP LT 130 MM HG: CPT | Performed by: INTERNAL MEDICINE

## 2024-04-24 PROCEDURE — 3046F HEMOGLOBIN A1C LEVEL >9.0%: CPT | Performed by: INTERNAL MEDICINE

## 2024-04-24 PROCEDURE — 3079F DIAST BP 80-89 MM HG: CPT | Performed by: INTERNAL MEDICINE

## 2024-04-24 RX ORDER — CARVEDILOL 25 MG/1
25 TABLET ORAL 2 TIMES DAILY
Qty: 180 TABLET | Refills: 3 | Status: SHIPPED | OUTPATIENT
Start: 2024-04-24

## 2024-04-24 RX ORDER — AMLODIPINE BESYLATE 5 MG/1
5 TABLET ORAL DAILY
Qty: 90 TABLET | Refills: 3 | Status: SHIPPED | OUTPATIENT
Start: 2024-04-24

## 2024-04-24 RX ORDER — ROSUVASTATIN CALCIUM 40 MG/1
40 TABLET, COATED ORAL DAILY
Qty: 90 TABLET | Refills: 3 | Status: SHIPPED | OUTPATIENT
Start: 2024-04-24

## 2024-04-24 RX ORDER — SPIRONOLACTONE 25 MG/1
12.5 TABLET ORAL DAILY
Qty: 45 TABLET | Refills: 3 | Status: SHIPPED | OUTPATIENT
Start: 2024-04-24

## 2024-04-24 NOTE — TELEPHONE ENCOUNTER
Patient has not followed up with EP MD or EP NP since 10/2018 ([facility]). He should establish with EP MD for device management. Please call patient to have him establish as a new patient with EP MD (next available) to have proper device interrogation in office and address any changes that need to be made.

## 2024-04-24 NOTE — TELEPHONE ENCOUNTER
Pt had device check 4/10/24.  RV was 92.76%.  SMM wanted to have this reviewed and see if you wanted to make any changes to his settings. Thank you.

## 2024-04-24 NOTE — TELEPHONE ENCOUNTER
4/24 Called [phone]. [facility] for pt to call and schedule new pt EPMD appt to establish as a new patient with EP MD (next available) to have proper device interrogation in office and address any changes that need to be made.

## 2024-04-24 NOTE — PATIENT INSTRUCTIONS
Plan:  Labs reviewed in epic and discussed with patient.  Current medications reviewed.  Refills given as warranted.  I am going to reach out to my electrical partners and have them look at your device and see if they want to make any changes to your settings.   Your diabetes number is too high.  I recommend that you see an endocrinologist to help manage your diabetes and decrease future medical problems.    Try to watch your diet as best as you can to help your cholesterol.  Try to limit your red meat, fried foods, butter, heavy cream sauce, desserts.   Recommend starting repatha to help lower your cholesterol.  This is an injection you will take every other week.  Repeat cholesterol in 4 months after your start taking the repatha.      Follow up with me in 6 months

## 2024-04-29 NOTE — TELEPHONE ENCOUNTER
Pt's sister Hazel returned call to sched EP.  Appt scheduled date/time/location was given and verified with sister.

## 2024-04-29 NOTE — TELEPHONE ENCOUNTER
4/29 Second Attempt: Called [phone]. LVM for pt to call and schedule first avail EPMD appt and device check to establish with EP and device check

## 2024-05-03 ENCOUNTER — TELEPHONE (OUTPATIENT)
Dept: CARDIOLOGY CLINIC | Age: 69
End: 2024-05-03

## 2024-05-03 NOTE — TELEPHONE ENCOUNTER
Pt sister called and stated [facility] needs blood work.  Blood work and LOV note faxed to [phone]

## 2024-05-17 ENCOUNTER — TELEPHONE (OUTPATIENT)
Dept: CARDIOLOGY CLINIC | Age: 69
End: 2024-05-17

## 2024-05-17 NOTE — TELEPHONE ENCOUNTER
Fuad from [facility] pharmacy contacted office requesting lipid panel results, Fuad states it should have been completed in the past week. Informed Fuad they have not been completed. Fuad will be reaching out to pt. CB# [phone]

## 2024-05-18 ENCOUNTER — HOSPITAL ENCOUNTER (OUTPATIENT)
Age: 69
Discharge: HOME | End: 2024-05-18
Payer: MEDICARE

## 2024-05-18 DIAGNOSIS — I25.5 ISCHEMIC CARDIOMYOPATHY: ICD-10-CM

## 2024-05-18 LAB
ALBUMIN SERPL-MCNC: 4.5 G/DL (ref 3.4–5)
ALP SERPL-CCNC: 103 U/L (ref 40–129)
ALT SERPL-CCNC: 31 U/L (ref 10–40)
AST SERPL-CCNC: 30 U/L (ref 15–37)
BILIRUB DIRECT SERPL-MCNC: <0.2 MG/DL (ref 0–0.3)
BILIRUB INDIRECT SERPL-MCNC: NORMAL MG/DL (ref 0–1)
BILIRUB SERPL-MCNC: 0.5 MG/DL (ref 0–1)
PROT SERPL-MCNC: 8 G/DL (ref 6.4–8.2)

## 2024-05-18 PROCEDURE — 80076 HEPATIC FUNCTION PANEL: CPT

## 2024-05-18 PROCEDURE — 36415 COLL VENOUS BLD VENIPUNCTURE: CPT

## 2024-05-18 PROCEDURE — 80061 LIPID PANEL: CPT

## 2024-05-19 LAB
CHOLEST SERPL-MCNC: 194 MG/DL (ref 0–199)
HDLC SERPL-MCNC: 38 MG/DL (ref 40–60)
LDL CHOLESTEROL: 101 MG/DL
TRIGL SERPL-MCNC: 275 MG/DL (ref 0–150)
VLDLC SERPL CALC-MCNC: 55 MG/DL

## 2024-05-20 ENCOUNTER — TELEPHONE (OUTPATIENT)
Dept: CARDIOLOGY CLINIC | Age: 69
End: 2024-05-20

## 2024-05-20 NOTE — TELEPHONE ENCOUNTER
Spoke with sister.  She notes that patient needed updated FLP sent to [facility] to get PA done to start Repatha, so NO he has not started it yet.  I have faxed  these labs to them.  He refuses to go the endocrinologist.

## 2024-05-20 NOTE — TELEPHONE ENCOUNTER
OK. Thanks. Tell him to watch diet as best possible and take OTC fish oil tablets (2 tabs twice daily) to help TG.

## 2024-05-20 NOTE — TELEPHONE ENCOUNTER
----- Message from Pedro Tang MD sent at 5/18/2024 12:45 PM EDT -----  Normal liver tests. Good news. cpm

## 2024-05-20 NOTE — TELEPHONE ENCOUNTER
----- Message from Pedro Tang MD sent at 5/20/2024  7:30 AM EDT -----  TG are improved. LDL still suboptimal 101. Is he taking crestor 40mg qd and has he started repatha and if so when? Did he see endocrinologist about DM? His HA1c was > 11 in March.

## 2024-06-11 ENCOUNTER — TELEPHONE (OUTPATIENT)
Dept: CARDIOLOGY CLINIC | Age: 69
End: 2024-06-11

## 2024-06-11 NOTE — TELEPHONE ENCOUNTER
Device check assigned to me for review of fluid status (follows with DR. Tang) Appears he is becoming more fluid overload.   Looks like he is taking lasix. Recommend he take an extra dose of the lasix daily for the next 2 days and then go back down to his regular dose.   Thanks.

## 2024-06-12 ENCOUNTER — NURSE ONLY (OUTPATIENT)
Dept: CARDIOLOGY CLINIC | Age: 69
End: 2024-06-12
Payer: MEDICARE

## 2024-06-12 ENCOUNTER — OFFICE VISIT (OUTPATIENT)
Dept: CARDIOLOGY CLINIC | Age: 69
End: 2024-06-12
Payer: MEDICARE

## 2024-06-12 VITALS
WEIGHT: 249.6 LBS | SYSTOLIC BLOOD PRESSURE: 104 MMHG | DIASTOLIC BLOOD PRESSURE: 60 MMHG | OXYGEN SATURATION: 96 % | HEIGHT: 69 IN | HEART RATE: 66 BPM | BODY MASS INDEX: 36.97 KG/M2

## 2024-06-12 DIAGNOSIS — I25.5 ISCHEMIC CARDIOMYOPATHY: Primary | ICD-10-CM

## 2024-06-12 DIAGNOSIS — I10 ESSENTIAL HYPERTENSION: ICD-10-CM

## 2024-06-12 DIAGNOSIS — Z95.810 BIVENTRICULAR ICD (IMPLANTABLE CARDIOVERTER-DEFIBRILLATOR) IN PLACE: Primary | ICD-10-CM

## 2024-06-12 DIAGNOSIS — I25.5 ISCHEMIC CARDIOMYOPATHY: ICD-10-CM

## 2024-06-12 DIAGNOSIS — Z95.810 BIVENTRICULAR ICD (IMPLANTABLE CARDIOVERTER-DEFIBRILLATOR) IN PLACE: ICD-10-CM

## 2024-06-12 DIAGNOSIS — I50.23 ACUTE ON CHRONIC SYSTOLIC CHF (CONGESTIVE HEART FAILURE) (HCC): ICD-10-CM

## 2024-06-12 DIAGNOSIS — I50.22 CHRONIC SYSTOLIC HEART FAILURE (HCC): ICD-10-CM

## 2024-06-12 DIAGNOSIS — I44.7 LBBB (LEFT BUNDLE BRANCH BLOCK): ICD-10-CM

## 2024-06-12 PROCEDURE — 3074F SYST BP LT 130 MM HG: CPT | Performed by: INTERNAL MEDICINE

## 2024-06-12 PROCEDURE — 1123F ACP DISCUSS/DSCN MKR DOCD: CPT | Performed by: INTERNAL MEDICINE

## 2024-06-12 PROCEDURE — 99204 OFFICE O/P NEW MOD 45 MIN: CPT | Performed by: INTERNAL MEDICINE

## 2024-06-12 PROCEDURE — 93284 PRGRMG EVAL IMPLANTABLE DFB: CPT | Performed by: INTERNAL MEDICINE

## 2024-06-12 PROCEDURE — 3078F DIAST BP <80 MM HG: CPT | Performed by: INTERNAL MEDICINE

## 2024-06-12 RX ORDER — SEMAGLUTIDE 1.34 MG/ML
1 INJECTION, SOLUTION SUBCUTANEOUS
COMMUNITY
Start: 2024-05-22

## 2024-06-12 ASSESSMENT — ENCOUNTER SYMPTOMS
RIGHT EYE: 0
HEMATOCHEZIA: 0
LEFT EYE: 0
SHORTNESS OF BREATH: 0
HEMATEMESIS: 0
WHEEZING: 0
STRIDOR: 0

## 2024-06-12 NOTE — PATIENT INSTRUCTIONS
Plan:     Remote device checks every 3 months.   Continue taking current cardiac medication as prescribed.   Follow up with my nurse practitioner in 6 months.

## 2024-06-12 NOTE — PROGRESS NOTES
Assessment:     1.  Ischemic cardiomyopathy/chronic systolic heart failure: Status post CRT-D implant in 2018. CABG in 2009. LVEF 25-30%.     Appears to be doing well from a symptom standpoint with NYHA class II symptoms.  No significant evidence of volume overload at this time.  He is on oral diuretics.  OptiVol was rising and he was instructed to take additional diuretics.  Continue medical therapy.  No significant arrhythmias noted on device interrogation.  His device has about 18 months of battery longevity.  Will continue remote monitoring.    2.  Hypertension: Good blood pressure control overall.  Continue medical therapy.    Plan:     Remote device checks every 3 months.   Continue taking current cardiac medication as prescribed.   Follow up with my nurse practitioner in 6 months.     Subjective:     Patient ID: Vj Moody is a 68 y.o. male.    Chief Complaint:  Chief Complaint   Patient presents with    New Patient    Device Check    Cardiomyopathy     HPI    Patient is a pleasant 68 y.o. male who presents for evaluation of ischemic cardiomyopathy and device management. The patient has a past medical history of ischemic cardiomyopathy, coronary artery disease s/p CABG 2009, congestive heart failure, hypertension, and obesity. He was admitted from 12/04/17-12/08/17 for CHF. His echocardiogram on 12/04/17 showed his LVEF was 25-30%. Myoview stress test showed fixed anterior defects, no ischemia with LVEF .24. His echocardiogram from 02/06/18 showed his LVEF was still 25-30% despite more than 90 days of optimally adjusted GDMT with Coreg and valsartan. He underwent CRT - D placement on 04/18/18.     Office Visit ([facility], 06/12/2024):  He presents to the clinic today to re-establish care with electrophysiology. He reports that his device was placed in 2017. He does not snore while sleeping. Patient denies current edema, chest pain, shortness of breath, palpitations, dizziness or syncope. Patient is taking

## 2024-07-17 ENCOUNTER — HOSPITAL ENCOUNTER (OUTPATIENT)
Age: 69
Discharge: HOME | End: 2024-07-17
Payer: MEDICARE

## 2024-07-17 LAB
ALBUMIN SERPL-MCNC: 4.5 G/DL (ref 3.4–5)
ANION GAP SERPL CALCULATED.3IONS-SCNC: 17 MMOL/L (ref 3–16)
BILIRUB UR QL STRIP.AUTO: NEGATIVE
BUN SERPL-MCNC: 36 MG/DL (ref 7–20)
CALCIUM SERPL-MCNC: 9.3 MG/DL (ref 8.3–10.6)
CHLORIDE SERPL-SCNC: 101 MMOL/L (ref 99–110)
CLARITY UR: CLEAR
CO2 SERPL-SCNC: 25 MMOL/L (ref 21–32)
COLOR UR: YELLOW
CREAT SERPL-MCNC: 1.8 MG/DL (ref 0.8–1.3)
GFR SERPLBLD CREATININE-BSD FMLA CKD-EPI: 40 ML/MIN/{1.73_M2}
GLUCOSE SERPL-MCNC: 160 MG/DL (ref 70–99)
GLUCOSE UR STRIP.AUTO-MCNC: >=1000 MG/DL
HGB UR QL STRIP.AUTO: NEGATIVE
KETONES UR STRIP.AUTO-MCNC: NEGATIVE MG/DL
LEUKOCYTE ESTERASE UR QL STRIP.AUTO: NEGATIVE
NITRITE UR QL STRIP.AUTO: NEGATIVE
PH UR STRIP.AUTO: 5.5 [PH] (ref 5–8)
PHOSPHATE SERPL-MCNC: 4.1 MG/DL (ref 2.5–4.9)
POTASSIUM SERPL-SCNC: 3.5 MMOL/L (ref 3.5–5.1)
PROT UR STRIP.AUTO-MCNC: NEGATIVE MG/DL
SODIUM SERPL-SCNC: 143 MMOL/L (ref 136–145)
SP GR UR STRIP.AUTO: 1.01 (ref 1–1.03)
UA DIPSTICK W REFLEX MICRO PNL UR: ABNORMAL
URN SPEC COLLECT METH UR: ABNORMAL
UROBILINOGEN UR STRIP-ACNC: 0.2 E.U./DL

## 2024-07-17 PROCEDURE — 80069 RENAL FUNCTION PANEL: CPT

## 2024-07-17 PROCEDURE — 81003 URINALYSIS AUTO W/O SCOPE: CPT

## 2024-07-18 LAB
REASON FOR REJECTION: NORMAL
REJECTED TEST: NORMAL

## 2024-09-10 PROCEDURE — 93296 REM INTERROG EVL PM/IDS: CPT | Performed by: INTERNAL MEDICINE

## 2024-09-10 PROCEDURE — 93295 DEV INTERROG REMOTE 1/2/MLT: CPT | Performed by: INTERNAL MEDICINE

## 2024-09-23 ENCOUNTER — TELEPHONE (OUTPATIENT)
Dept: CARDIOLOGY CLINIC | Age: 69
End: 2024-09-23

## 2024-10-17 NOTE — PROGRESS NOTES
.1.  Do not eat or drink anything after 12 midnight prior to surgery. This includes no water, chewing gum or mints. 2.  Take the following pills with a small sip of water on the morning of surgery 04/13/2022.  3.  Aspirin, Ibuprofen, Advil, Naproxen, Vitamin E and other Anti-inflammatory products should be stopped for 5 days before surgery or as directed by your physician. 4.  Check with your doctor regarding stopping Plavix, Coumadin, Lovenox, Fragmin or other blood thinners. 5.  Do not smoke and do not drink alcoholic beverages 24 hours prior to surgery. This includes NA Beer. 6.  You may brush your teeth and gargle the morning of surgery. DO NOT SWALLOW WATER.  7.  You MUST make arrangements for a responsible adult to take you home after your surgery. You will not be allowed to leave alone or drive yourself home. It is strongly suggested someone stay with you the first 24 hours. Your surgery will be cancelled if you do not have a ride home. 8.  A parent/legal guardian must accompany a child scheduled for surgery and plan to stay at the hospital until the child is discharged. Please do not bring other children with you. 9.  Please wear simple, loose fitting clothing to the hospital.  Deepti Shin not bring valuables ( money, credit cards, checkbooks, etc.)  Do not wear any makeup (including no eye makeup) or nail polish on your fingers or toes. 10.  Do not wear any jewelry or piercing on the day of surgery. All body piercing jewelry must be removed. 11.  If you have dentures, they will be removed before going to the OR; we will provide you a container. If you wear contact lenses or glasses, they will be removed; please bring a case for them. 12.  Please see your family doctor/pediatrician for a history & physical and/or concerning medications. Bring any test results/reports from your physician's office the day of surgery.     13.  Remember to bring Blood Bank Bracelet to the hospital on the day of Continue same medications/treatment.  Patient educated on proper medication use.  Patient educated on risk factor modification.  Please bring any lab results from other providers/physicians to your next appointment.    Please bring all medicines, vitamins, and herbal supplements with you when you come to the office.    Prescriptions will not be filled unless you are compliant with your follow up appointments or have a follow up appointment scheduled as per instruction of your physician. Refills should be requested at the time of your visit.    Follow up in 9 months with Dr. Rogelio Jernigan M.D.    Follow up with NP for BP check with home machines  PCP Dr. Woodall or Dr. REYES   Referral to Dr. Nelson  Take an Extra Amlodipine if BP is greater than 150 sbp    I, HARJIT ALCANTAR RN, AM SCRIBING FOR AND IN THE PRESENCE OF DR. ROGELIO JERNIGAN MD, FACC     surgery. 14.  If you have a Living Will and Durable Power of  for Healthcare, please bring in a copy. 13.  Notify your Surgeon if you develop any illness between now and surgery time; cough, cold, fever, sore throat, nausea, vomiting, etc.  Please notify your surgeon if you experience dizziness, shortness of breath or blurred vision between now and the time of your surgery. 16.  DO NOT shave your operative site 96 hours (4 days) prior to surgery. For face and neck surgery, men may use an electric razor 48 hours (2 days) prior to surgery. 17. Shower the night before surgery and the morning of surgery with  an antibacterial soap   or  Chlorhexidine gluconate (for total joint replacement). To provide excellent care, visitors will be limited to two in a room at any given time. Please no children under the age of 15 in the surgical department.

## 2024-10-25 DIAGNOSIS — I25.5 ISCHEMIC CARDIOMYOPATHY: ICD-10-CM

## 2024-10-25 RX ORDER — FUROSEMIDE 40 MG/1
TABLET ORAL
Qty: 180 TABLET | Refills: 0 | Status: SHIPPED | OUTPATIENT
Start: 2024-10-25

## 2024-11-01 NOTE — PROGRESS NOTES
MD Tj   empagliflozin (JARDIANCE) 10 MG tablet Take 1 tablet by mouth daily   Yes Tj Everett MD   insulin glargine (LANTUS;BASAGLAR) 100 UNIT/ML injection pen Inject 40 Units into the skin nightly Hold BS </= 90   Yes Tj Everett MD   aspirin 81 MG chewable tablet Take 1 tablet by mouth daily 4/21/18  Yes Juanis Montenegro APRN - CNP   glyBURIDE (DIABETA) 5 MG tablet Take 1 tablet by mouth daily (with breakfast) 4/19/18  Yes Horacio Brown MD   Dulaglutide (TRULICITY) 3 MG/0.5ML SOPN Inject 3 mg into the skin once a week  Patient not taking: Reported on 6/12/2024    ProviderTj MD      Allergies:  Patient has no known allergies.     Review of Systems:   Constitutional: there has been no unanticipated weight loss. There's been no change in energy level, sleep pattern, or activity level.     Eyes: No visual changes or diplopia. No scleral icterus.  ENT: No Headaches, hearing loss or vertigo. No mouth sores or sore throat.  Cardiovascular: Reviewed in HPI  Respiratory: No cough or wheezing, no sputum production. No hematemesis.    Gastrointestinal: No abdominal pain, appetite loss, blood in stools. No change in bowel or bladder habits.  Genitourinary: No dysuria, trouble voiding, or hematuria.  Musculoskeletal:  No gait disturbance, weakness or joint complaints.  Integumentary: No rash or pruritis.  Neurological: No headache, diplopia, change in muscle strength, numbness or tingling. No change in gait, balance, coordination, mood, affect, memory, mentation, behavior.  Psychiatric: No anxiety, no depression.  Endocrine: No malaise, fatigue or temperature intolerance. No excessive thirst, fluid intake, or urination. No tremor.  Hematologic/Lymphatic: No abnormal bruising or bleeding, blood clots or swollen lymph nodes.  Allergic/Immunologic: No nasal congestion or hives.    Physical Examination:    Vitals:    11/06/24 1322   BP: 110/80   Pulse: 77   SpO2: 96%   Weight: 113.9 kg

## 2024-11-06 ENCOUNTER — OFFICE VISIT (OUTPATIENT)
Dept: CARDIOLOGY CLINIC | Age: 69
End: 2024-11-06

## 2024-11-06 VITALS
SYSTOLIC BLOOD PRESSURE: 110 MMHG | OXYGEN SATURATION: 96 % | BODY MASS INDEX: 37.2 KG/M2 | HEART RATE: 77 BPM | WEIGHT: 251.2 LBS | HEIGHT: 69 IN | DIASTOLIC BLOOD PRESSURE: 80 MMHG

## 2024-11-06 DIAGNOSIS — Z79.899 MEDICATION MANAGEMENT: ICD-10-CM

## 2024-11-06 DIAGNOSIS — I25.5 ISCHEMIC CARDIOMYOPATHY: ICD-10-CM

## 2024-11-06 DIAGNOSIS — E78.49 OTHER HYPERLIPIDEMIA: ICD-10-CM

## 2024-11-06 DIAGNOSIS — I10 PRIMARY HYPERTENSION: ICD-10-CM

## 2024-11-06 DIAGNOSIS — I25.10 CORONARY ARTERY DISEASE INVOLVING NATIVE CORONARY ARTERY OF NATIVE HEART WITHOUT ANGINA PECTORIS: ICD-10-CM

## 2024-11-06 DIAGNOSIS — I44.7 LBBB (LEFT BUNDLE BRANCH BLOCK): Primary | ICD-10-CM

## 2024-11-06 DIAGNOSIS — Z95.810 BIVENTRICULAR ICD (IMPLANTABLE CARDIOVERTER-DEFIBRILLATOR) IN PLACE: ICD-10-CM

## 2024-11-06 RX ORDER — FUROSEMIDE 40 MG/1
TABLET ORAL
Qty: 180 TABLET | Refills: 3 | Status: SHIPPED | OUTPATIENT
Start: 2024-11-06

## 2024-11-06 RX ORDER — BLOOD-GLUCOSE SENSOR
EACH MISCELLANEOUS
COMMUNITY
Start: 2024-09-23

## 2024-11-06 RX ORDER — INSULIN LISPRO 100 [IU]/ML
INJECTION, SOLUTION INTRAVENOUS; SUBCUTANEOUS
COMMUNITY

## 2024-11-06 NOTE — PATIENT INSTRUCTIONS
Plan:  Labs reviewed in epic and discussed with patient.  Current medications reviewed.  Refills given as warranted.  Recommend rechecking cholesterol now that you have been on repatha.  -make sure you are fasting.   I will personally review your tests and then I will have my staff call you with the results.      Follow up with me in 6 months.

## 2024-11-11 ENCOUNTER — HOSPITAL ENCOUNTER (OUTPATIENT)
Age: 69
Discharge: HOME | End: 2024-11-11
Payer: MEDICARE

## 2024-11-11 DIAGNOSIS — Z79.899 MEDICATION MANAGEMENT: ICD-10-CM

## 2024-11-11 PROCEDURE — 80061 LIPID PANEL: CPT

## 2024-11-11 PROCEDURE — 36415 COLL VENOUS BLD VENIPUNCTURE: CPT

## 2024-11-12 LAB
CHOLEST SERPL-MCNC: 146 MG/DL (ref 0–199)
HDLC SERPL-MCNC: 36 MG/DL (ref 40–60)
LDLC SERPL CALC-MCNC: 57 MG/DL
TRIGL SERPL-MCNC: 266 MG/DL (ref 0–150)
VLDLC SERPL CALC-MCNC: 53 MG/DL

## 2024-11-13 NOTE — RESULT ENCOUNTER NOTE
Mahin Campbell MD  P [facility] Staff  Blood  cholesterol test is ok

## 2024-12-12 NOTE — PROGRESS NOTES
INR   Date Value Ref Range Status   04/18/2018 1.08 0.85 - 1.15 Final     Comment:     Effective 08/01/2017 at 3pm EST    Normal: 0.86 - 1.14  Therapeutic: 2.0 - 3.0  Pros. Valve: 2.5 - 3.5  AMI: 2.0 - 3.0       APTT: No components found for: \"PT2T\"  FASTING LIPID PANEL:   Lab Results   Component Value Date/Time    HDL 36 11/11/2024 11:54 AM    LDLDIRECT 97 04/17/2024 10:28 AM    TRIG 266 11/11/2024 11:54 AM     LIVER PROFILE:No results for input(s): \"AST\", \"ALT\" in the last 72 hours.    Invalid input(s): \"ALB\"    IMPRESSION:    Patient Active Problem List   Diagnosis    HTN (hypertension)    Coronary artery disease involving native heart without angina pectoris    Obesity    Bilateral leg edema    New onset of congestive heart failure (HCC)    Acute on chronic systolic CHF (congestive heart failure) (HCC)    Ischemic cardiomyopathy    Diabetes mellitus (HCC)    Biventricular ICD (implantable cardioverter-defibrillator) in place    LBBB (left bundle branch block)    Cardiomyopathy (HCC)    Other hyperlipidemia       Assessment:   Ischemic cardiomyopathy  -[employer] CRT-D 4/18/2018  -LVEF 25%  LBBB  Diabetes--hemoglobin A1c 11.8  Hyperlipidemia  Hypertension  CABG    Plan:   Continue amlodipine 5 mg daily  Continue carvedilol 25 mg twice daily with meals  Continue Jardiance 10 mg daily  Continue Lasix 80 mg daily  Continue spironolactone 12.5 mg daily  Continue Crestor 40 mg daily  Diabetic regimen per primary physician  Continue every 3 months remote device checks    Follow up in 6 months Mary [facility]  [phone]

## 2024-12-16 ENCOUNTER — OFFICE VISIT (OUTPATIENT)
Dept: CARDIOLOGY CLINIC | Age: 69
End: 2024-12-16
Payer: MEDICARE

## 2024-12-16 ENCOUNTER — NURSE ONLY (OUTPATIENT)
Dept: CARDIOLOGY CLINIC | Age: 69
End: 2024-12-16

## 2024-12-16 VITALS
OXYGEN SATURATION: 99 % | HEIGHT: 69 IN | DIASTOLIC BLOOD PRESSURE: 60 MMHG | HEART RATE: 73 BPM | WEIGHT: 249.8 LBS | SYSTOLIC BLOOD PRESSURE: 98 MMHG | BODY MASS INDEX: 37 KG/M2

## 2024-12-16 DIAGNOSIS — I25.5 ISCHEMIC CARDIOMYOPATHY: ICD-10-CM

## 2024-12-16 DIAGNOSIS — I44.7 LBBB (LEFT BUNDLE BRANCH BLOCK): Primary | ICD-10-CM

## 2024-12-16 DIAGNOSIS — Z95.810 BIVENTRICULAR ICD (IMPLANTABLE CARDIOVERTER-DEFIBRILLATOR) IN PLACE: Primary | ICD-10-CM

## 2024-12-16 PROCEDURE — 1159F MED LIST DOCD IN RCRD: CPT

## 2024-12-16 PROCEDURE — 3078F DIAST BP <80 MM HG: CPT

## 2024-12-16 PROCEDURE — 99214 OFFICE O/P EST MOD 30 MIN: CPT

## 2024-12-16 PROCEDURE — 1123F ACP DISCUSS/DSCN MKR DOCD: CPT

## 2024-12-16 PROCEDURE — 1160F RVW MEDS BY RX/DR IN RCRD: CPT

## 2024-12-16 PROCEDURE — 93000 ELECTROCARDIOGRAM COMPLETE: CPT

## 2024-12-16 PROCEDURE — 3074F SYST BP LT 130 MM HG: CPT

## 2024-12-16 NOTE — PATIENT INSTRUCTIONS
Continue amlodipine 5 mg daily  Continue carvedilol 25 mg twice daily with meals  Continue Jardiance 10 mg daily  Continue Lasix 80 mg daily  Continue spironolactone 12.5 mg daily  Continue Crestor 40 mg daily  Diabetic regimen per primary physician  Continue every 3 months remote device checks      Follow up in 6 months Mary LOWERY

## 2025-02-17 ENCOUNTER — TELEPHONE (OUTPATIENT)
Dept: CARDIOLOGY CLINIC | Age: 70
End: 2025-02-17

## 2025-02-17 NOTE — TELEPHONE ENCOUNTER
Lab Results   Component Value Date    CHOL 146 11/11/2024    TRIG 266 (H) 11/11/2024    HDL 36 (L) 11/11/2024    LDL 57 11/11/2024    VLDL 53 11/11/2024

## 2025-02-17 NOTE — TELEPHONE ENCOUNTER
Pt Randall PA has  and needs to be resubmitted. Prior PA was done by [facility].    Last ov 11..24-[facility]  Next ov 6..-[facility]

## 2025-03-11 RX ORDER — TELMISARTAN 80 MG/1
80 TABLET ORAL DAILY
Qty: 90 TABLET | Refills: 3 | Status: SHIPPED | OUTPATIENT
Start: 2025-03-11

## 2025-03-11 NOTE — TELEPHONE ENCOUNTER
Patient last seen on 11/6/24. Advised to follow up 6 months. Next appointment 6/11/25.       Lab Results   Component Value Date     09/11/2024    K 4.1 09/11/2024     09/11/2024    CO2 20 09/11/2024    BUN 34 09/11/2024    CREATININE 1.57 09/11/2024    GLUCOSE 107 09/11/2024    CALCIUM 9.4 09/11/2024    BILITOT 92 (A) 09/11/2024    ALKPHOS 92 09/11/2024    AST 18 09/11/2024    ALT 28 09/11/2024    LABGLOM 48 09/11/2024    GFRAA 49 (A) 02/21/2022    AGRATIO 1.1 04/17/2024    GLOB 3.3 02/01/2021

## 2025-03-31 ENCOUNTER — HOSPITAL ENCOUNTER (OUTPATIENT)
Age: 70
Discharge: HOME | End: 2025-03-31
Payer: MEDICARE

## 2025-03-31 LAB
ALBUMIN SERPL-MCNC: 4.7 G/DL (ref 3.4–5)
ANION GAP SERPL CALCULATED.3IONS-SCNC: 13 MMOL/L (ref 3–16)
BILIRUB UR QL STRIP.AUTO: NEGATIVE
BUN SERPL-MCNC: 24 MG/DL (ref 7–20)
CALCIUM SERPL-MCNC: 9.9 MG/DL (ref 8.3–10.6)
CHLORIDE SERPL-SCNC: 103 MMOL/L (ref 99–110)
CLARITY UR: CLEAR
CO2 SERPL-SCNC: 28 MMOL/L (ref 21–32)
COLOR UR: YELLOW
CREAT SERPL-MCNC: 1.5 MG/DL (ref 0.8–1.3)
GFR SERPLBLD CREATININE-BSD FMLA CKD-EPI: 50 ML/MIN/{1.73_M2}
GLUCOSE SERPL-MCNC: 150 MG/DL (ref 70–99)
GLUCOSE UR STRIP.AUTO-MCNC: >=1000 MG/DL
HGB UR QL STRIP.AUTO: NEGATIVE
KETONES UR STRIP.AUTO-MCNC: NEGATIVE MG/DL
LEUKOCYTE ESTERASE UR QL STRIP.AUTO: NEGATIVE
NITRITE UR QL STRIP.AUTO: NEGATIVE
PH UR STRIP.AUTO: 6 [PH] (ref 5–8)
PHOSPHATE SERPL-MCNC: 3.1 MG/DL (ref 2.5–4.9)
POTASSIUM SERPL-SCNC: 4.1 MMOL/L (ref 3.5–5.1)
PROT UR STRIP.AUTO-MCNC: NEGATIVE MG/DL
SODIUM SERPL-SCNC: 144 MMOL/L (ref 136–145)
SP GR UR STRIP.AUTO: 1.01 (ref 1–1.03)
UA DIPSTICK W REFLEX MICRO PNL UR: ABNORMAL
URN SPEC COLLECT METH UR: ABNORMAL
UROBILINOGEN UR STRIP-ACNC: 0.2 E.U./DL

## 2025-03-31 PROCEDURE — 81003 URINALYSIS AUTO W/O SCOPE: CPT

## 2025-03-31 PROCEDURE — 84156 ASSAY OF PROTEIN URINE: CPT

## 2025-03-31 PROCEDURE — 82306 VITAMIN D 25 HYDROXY: CPT

## 2025-03-31 PROCEDURE — 82570 ASSAY OF URINE CREATININE: CPT

## 2025-03-31 PROCEDURE — 80069 RENAL FUNCTION PANEL: CPT

## 2025-04-01 LAB
25(OH)D3 SERPL-MCNC: 38.8 NG/ML
CREAT UR-MCNC: 70 MG/DL (ref 39–259)
PROT UR-MCNC: 15 MG/DL
PROT/CREAT UR-RTO: 0.2 MG/DL

## 2025-04-28 RX ORDER — EVOLOCUMAB 140 MG/ML
INJECTION, SOLUTION SUBCUTANEOUS
Qty: 2 ADJUSTABLE DOSE PRE-FILLED PEN SYRINGE | Refills: 3 | Status: SHIPPED | OUTPATIENT
Start: 2025-04-28

## 2025-04-28 NOTE — TELEPHONE ENCOUNTER
NOV 6/11/25  LOV 11/6/24    Lab Results   Component Value Date    CHOL 146 11/11/2024    TRIG 266 (H) 11/11/2024    HDL 36 (L) 11/11/2024    LDL 57 11/11/2024    VLDL 53 11/11/2024

## 2025-05-21 DIAGNOSIS — I25.5 ISCHEMIC CARDIOMYOPATHY: ICD-10-CM

## 2025-05-22 RX ORDER — CARVEDILOL 25 MG/1
25 TABLET ORAL 2 TIMES DAILY
Qty: 180 TABLET | Refills: 0 | Status: SHIPPED | OUTPATIENT
Start: 2025-05-22

## 2025-06-11 ENCOUNTER — OFFICE VISIT (OUTPATIENT)
Dept: CARDIOLOGY CLINIC | Age: 70
End: 2025-06-11
Payer: MEDICARE

## 2025-06-11 VITALS
HEIGHT: 69 IN | DIASTOLIC BLOOD PRESSURE: 88 MMHG | SYSTOLIC BLOOD PRESSURE: 120 MMHG | OXYGEN SATURATION: 94 % | HEART RATE: 70 BPM | WEIGHT: 250.4 LBS | BODY MASS INDEX: 37.09 KG/M2

## 2025-06-11 DIAGNOSIS — I10 PRIMARY HYPERTENSION: ICD-10-CM

## 2025-06-11 DIAGNOSIS — I25.5 ISCHEMIC CARDIOMYOPATHY: ICD-10-CM

## 2025-06-11 DIAGNOSIS — R94.31 ABNORMAL ELECTROCARDIOGRAM (ECG) (EKG): ICD-10-CM

## 2025-06-11 DIAGNOSIS — Z95.810 BIVENTRICULAR ICD (IMPLANTABLE CARDIOVERTER-DEFIBRILLATOR) IN PLACE: ICD-10-CM

## 2025-06-11 DIAGNOSIS — I44.7 LBBB (LEFT BUNDLE BRANCH BLOCK): Primary | ICD-10-CM

## 2025-06-11 DIAGNOSIS — E78.49 OTHER HYPERLIPIDEMIA: ICD-10-CM

## 2025-06-11 DIAGNOSIS — I42.9 CARDIOMYOPATHY, UNSPECIFIED TYPE (HCC): ICD-10-CM

## 2025-06-11 DIAGNOSIS — I25.10 CORONARY ARTERY DISEASE INVOLVING NATIVE CORONARY ARTERY OF NATIVE HEART WITHOUT ANGINA PECTORIS: ICD-10-CM

## 2025-06-11 PROCEDURE — 1159F MED LIST DOCD IN RCRD: CPT | Performed by: INTERNAL MEDICINE

## 2025-06-11 PROCEDURE — 99214 OFFICE O/P EST MOD 30 MIN: CPT | Performed by: INTERNAL MEDICINE

## 2025-06-11 PROCEDURE — 3079F DIAST BP 80-89 MM HG: CPT | Performed by: INTERNAL MEDICINE

## 2025-06-11 PROCEDURE — 3074F SYST BP LT 130 MM HG: CPT | Performed by: INTERNAL MEDICINE

## 2025-06-11 PROCEDURE — 1123F ACP DISCUSS/DSCN MKR DOCD: CPT | Performed by: INTERNAL MEDICINE

## 2025-06-11 RX ORDER — CARVEDILOL 25 MG/1
25 TABLET ORAL 2 TIMES DAILY
Qty: 180 TABLET | Refills: 3 | Status: SHIPPED | OUTPATIENT
Start: 2025-06-11

## 2025-06-11 RX ORDER — AMLODIPINE BESYLATE 5 MG/1
5 TABLET ORAL DAILY
Qty: 90 TABLET | Refills: 3 | Status: SHIPPED | OUTPATIENT
Start: 2025-06-11

## 2025-06-11 RX ORDER — SPIRONOLACTONE 25 MG/1
12.5 TABLET ORAL DAILY
Qty: 45 TABLET | Refills: 3 | Status: SHIPPED | OUTPATIENT
Start: 2025-06-11

## 2025-06-11 NOTE — PATIENT INSTRUCTIONS
Plan:  Labs reviewed in epic and discussed with patient.  Current medications reviewed.  Refills given as warranted.  Try to watch your diet as best as possible and make sure you continue to take your cholesterol medications.  I will personally review your tests and then I will have my staff call you with the results.   Call [phone] to schedule An Echocardiogram   - Looks at the size and strength of your heart  - This test is an ultrasound of your heart just like when you see an ultrasound that a woman has when she is pregnant.  - The test records the movement of your heart valves and chambers.  - It evaluates heart valves, chamber enlargement, abnormal openings, or any fluid in the sac surrounding the heart.      Follow up with me in 6 months.

## 2025-06-18 NOTE — PROGRESS NOTES
02/16/2018)  Summary   This is a limited study for cardiomyopathy follow-up.   Definity was used to enhance left ventricular endocardial visualization.   There is severe global hypokinesis.   Left ventricular systolic function is reduced with ejection fraction   estimated at 25-30 %.   Left ventricular size is moderately increased .   Normal left ventricular wall thickness.     Echocardiogram  (Date: 12/04/2017)  Summary   Left ventricle systolic function is severely reduced with an estimated   ejection fraction of 25-30%. There is hypokinesis of the inferior and   inferoseptum wall.   There is akinesis of the apex, apical septum, apical lateral, anterior and   anteroseptum walls.   Left ventricular size is moderately increased.   Grade III diastolic dysfunction with elevated filling pressure.   Mild mitral and aortic regurgitation.   The right ventricle is moderately enlarged.   Right ventricular systolic function is moderately reduced .   Systolic pulmonary artery pressure (SPAP) is normal and estimated at 19 mmHg   (RA pressure 8 mmHg).        Stress Test (Date: 05/17/2023)   Summary   Severe LV dysfunction with LVEF 25%   Distal anteroapical akinesis/scar with moderate con-infarct ischemia   Inferoseptal hypokinesis/scar            All labs and testing reviewed.  CARDIOLOGY LABS:   CBC:   WBC   Date Value Ref Range Status   03/12/2025 6.8 10^3/mL Final   09/11/2024 6.7 10^3/mL Final   04/17/2024 5.8 4.0 - 11.0 K/uL Final     Hemoglobin   Date Value Ref Range Status   03/12/2025 15.4 13.5 - 17.5 g/dL Final   09/11/2024 15.1 13.5 - 17.5 g/dL Final   04/17/2024 13.3 (L) 13.5 - 17.5 g/dL Final     Platelets   Date Value Ref Range Status   03/12/2025 158 K/µL Final   09/11/2024 166 K/µL Final   04/17/2024 161 135 - 450 K/uL Final     BMP:   Sodium   Date Value Ref Range Status   03/31/2025 144 136 - 145 mmol/L Final   03/12/2025 142 mmol/L Final   09/11/2024 143 mmol/L Final     Potassium   Date Value Ref Range

## 2025-06-19 ENCOUNTER — CLINICAL SUPPORT (OUTPATIENT)
Dept: CARDIOLOGY CLINIC | Age: 70
End: 2025-06-19
Payer: MEDICARE

## 2025-06-19 ENCOUNTER — OFFICE VISIT (OUTPATIENT)
Dept: CARDIOLOGY CLINIC | Age: 70
End: 2025-06-19
Payer: MEDICARE

## 2025-06-19 VITALS
OXYGEN SATURATION: 97 % | BODY MASS INDEX: 36.64 KG/M2 | WEIGHT: 247.4 LBS | HEART RATE: 67 BPM | SYSTOLIC BLOOD PRESSURE: 114 MMHG | DIASTOLIC BLOOD PRESSURE: 82 MMHG | HEIGHT: 69 IN

## 2025-06-19 DIAGNOSIS — Z95.810 BIVENTRICULAR ICD (IMPLANTABLE CARDIOVERTER-DEFIBRILLATOR) IN PLACE: Primary | ICD-10-CM

## 2025-06-19 DIAGNOSIS — I25.5 ISCHEMIC CARDIOMYOPATHY: ICD-10-CM

## 2025-06-19 DIAGNOSIS — I10 PRIMARY HYPERTENSION: ICD-10-CM

## 2025-06-19 DIAGNOSIS — I44.7 LBBB (LEFT BUNDLE BRANCH BLOCK): Primary | ICD-10-CM

## 2025-06-19 DIAGNOSIS — E11.69 TYPE 2 DIABETES MELLITUS WITH OTHER SPECIFIED COMPLICATION, UNSPECIFIED WHETHER LONG TERM INSULIN USE (HCC): ICD-10-CM

## 2025-06-19 DIAGNOSIS — Z95.810 BIVENTRICULAR ICD (IMPLANTABLE CARDIOVERTER-DEFIBRILLATOR) IN PLACE: ICD-10-CM

## 2025-06-19 DIAGNOSIS — I50.22 CHRONIC SYSTOLIC HEART FAILURE (HCC): ICD-10-CM

## 2025-06-19 PROCEDURE — 93000 ELECTROCARDIOGRAM COMPLETE: CPT

## 2025-06-19 PROCEDURE — 3074F SYST BP LT 130 MM HG: CPT

## 2025-06-19 PROCEDURE — G2211 COMPLEX E/M VISIT ADD ON: HCPCS

## 2025-06-19 PROCEDURE — 1123F ACP DISCUSS/DSCN MKR DOCD: CPT

## 2025-06-19 PROCEDURE — 1159F MED LIST DOCD IN RCRD: CPT

## 2025-06-19 PROCEDURE — 3051F HG A1C>EQUAL 7.0%<8.0%: CPT

## 2025-06-19 PROCEDURE — 3079F DIAST BP 80-89 MM HG: CPT

## 2025-06-19 PROCEDURE — 99214 OFFICE O/P EST MOD 30 MIN: CPT

## 2025-06-19 PROCEDURE — 1160F RVW MEDS BY RX/DR IN RCRD: CPT

## 2025-06-19 PROCEDURE — 93284 PRGRMG EVAL IMPLANTABLE DFB: CPT | Performed by: INTERNAL MEDICINE

## 2025-06-19 NOTE — PATIENT INSTRUCTIONS
Continue amlodipine 5 mg daily  Continue carvedilol 25 mg twice daily with meals  Continue Jardiance 10 mg daily  Continue Lasix 80 mg daily  Continue spironolactone 12.5 mg daily  Continue Crestor 40 mg daily  Diabetic regimen per primary physician  Continue Repatha as prescribed  Continue every 3 months remote device checks    Schedule echo as ordered by Dr Tang    Follow up in 9 months Mary LOWERY

## 2025-07-28 ENCOUNTER — HOSPITAL ENCOUNTER (OUTPATIENT)
Age: 70
Discharge: HOME | End: 2025-07-30
Attending: INTERNAL MEDICINE
Payer: MEDICARE

## 2025-07-28 VITALS
WEIGHT: 247 LBS | SYSTOLIC BLOOD PRESSURE: 114 MMHG | HEIGHT: 69 IN | DIASTOLIC BLOOD PRESSURE: 82 MMHG | BODY MASS INDEX: 36.58 KG/M2

## 2025-07-28 DIAGNOSIS — I42.9 CARDIOMYOPATHY, UNSPECIFIED TYPE (HCC): ICD-10-CM

## 2025-07-28 DIAGNOSIS — R94.31 ABNORMAL ELECTROCARDIOGRAM (ECG) (EKG): ICD-10-CM

## 2025-07-28 LAB
ECHO AO ROOT DIAM: 4 CM
ECHO AO ROOT INDEX: 1.77 CM/M2
ECHO AV CUSP MM: 2 CM
ECHO AV PEAK GRADIENT: 6 MMHG
ECHO AV PEAK VELOCITY: 1.2 M/S
ECHO BSA: 2.34 M2
ECHO EST RA PRESSURE: 3 MMHG
ECHO LA AREA 2C: 30.9 CM2
ECHO LA AREA 4C: 21.8 CM2
ECHO LA DIAMETER INDEX: 1.73 CM/M2
ECHO LA DIAMETER: 3.9 CM
ECHO LA MAJOR AXIS: 5.9 CM
ECHO LA MINOR AXIS: 6.4 CM
ECHO LA TO AORTIC ROOT RATIO: 0.98
ECHO LA VOL BP: 91 ML (ref 18–58)
ECHO LA VOL MOD A2C: 119 ML (ref 18–58)
ECHO LA VOL MOD A4C: 65 ML (ref 18–58)
ECHO LA VOL/BSA BIPLANE: 40 ML/M2 (ref 16–34)
ECHO LA VOLUME INDEX MOD A2C: 53 ML/M2 (ref 16–34)
ECHO LA VOLUME INDEX MOD A4C: 29 ML/M2 (ref 16–34)
ECHO LV E' LATERAL VELOCITY: 4.13 CM/S
ECHO LV E' SEPTAL VELOCITY: 5.22 CM/S
ECHO LV EDV A2C: 163 ML
ECHO LV EDV A4C: 196 ML
ECHO LV EDV INDEX A4C: 87 ML/M2
ECHO LV EDV NDEX A2C: 72 ML/M2
ECHO LV EF PHYSICIAN: 35 %
ECHO LV EJECTION FRACTION A2C: 33 %
ECHO LV EJECTION FRACTION A4C: 37 %
ECHO LV EJECTION FRACTION BIPLANE: 34 % (ref 55–100)
ECHO LV ESV A2C: 109 ML
ECHO LV ESV A4C: 123 ML
ECHO LV ESV INDEX A2C: 48 ML/M2
ECHO LV ESV INDEX A4C: 54 ML/M2
ECHO LV FRACTIONAL SHORTENING: 27 % (ref 28–44)
ECHO LV INTERNAL DIMENSION DIASTOLE INDEX: 2.43 CM/M2
ECHO LV INTERNAL DIMENSION DIASTOLIC: 5.5 CM (ref 4.2–5.9)
ECHO LV INTERNAL DIMENSION SYSTOLIC INDEX: 1.77 CM/M2
ECHO LV INTERNAL DIMENSION SYSTOLIC: 4 CM
ECHO LV ISOVOLUMETRIC RELAXATION TIME (IVRT): 119 MS
ECHO LV IVSD: 1.3 CM (ref 0.6–1)
ECHO LV MASS 2D: 304.3 G (ref 88–224)
ECHO LV MASS INDEX 2D: 134.7 G/M2 (ref 49–115)
ECHO LV POSTERIOR WALL DIASTOLIC: 1.3 CM (ref 0.6–1)
ECHO LV RELATIVE WALL THICKNESS RATIO: 0.47
ECHO MV A VELOCITY: 0.8 M/S
ECHO MV E VELOCITY: 0.45 M/S
ECHO MV E/A RATIO: 0.56
ECHO MV E/E' LATERAL: 10.9
ECHO MV E/E' RATIO (AVERAGED): 9.76
ECHO MV E/E' SEPTAL: 8.62
ECHO PV MAX VELOCITY: 1 M/S
ECHO PV PEAK GRADIENT: 4 MMHG
ECHO RA AREA 4C: 15.3 CM2
ECHO RA END SYSTOLIC VOLUME APICAL 4 CHAMBER INDEX BSA: 16 ML/M2
ECHO RA VOLUME: 37 ML
ECHO RV BASAL DIMENSION: 3.9 CM
ECHO RV FREE WALL PEAK S': 10.1 CM/S
ECHO RV LONGITUDINAL DIMENSION: 9.2 CM
ECHO RV MID DIMENSION: 2.9 CM
ECHO RV TAPSE: 1.8 CM (ref 1.7–?)
ECHO TV PEAK GRADIENT: 1 MMHG

## 2025-07-28 PROCEDURE — C8929 TTE W OR WO FOL WCON,DOPPLER: HCPCS

## 2025-07-28 PROCEDURE — 6360000004 HC RX CONTRAST MEDICATION: Performed by: INTERNAL MEDICINE

## 2025-07-28 PROCEDURE — 93306 TTE W/DOPPLER COMPLETE: CPT | Performed by: INTERNAL MEDICINE

## 2025-07-28 RX ADMIN — SULFUR HEXAFLUORIDE 2 ML: KIT at 14:38

## (undated) DEVICE — ENDO CARRY-ON PROCEDURE KIT INCLUDES SUCTION TUBING, LUBRICANT, GAUZE, BIOHAZARD STICKER, TRANSPORT PAD AND INTERCEPT BEDSIDE KIT.: Brand: ENDO CARRY-ON PROCEDURE KIT

## (undated) DEVICE — TRAP POLYP ETRAP

## (undated) DEVICE — ELECTRODE PT RET AD L9FT HI MOIST COND ADH HYDRGEL CORDED

## (undated) DEVICE — Device: Brand: DISPOSABLE ELECTROSURGICAL SNARE

## (undated) DEVICE — ELECTRODE,RADIOTRANSLUCENT,FOAM,3PK: Brand: MEDLINE